# Patient Record
Sex: MALE | Race: WHITE | NOT HISPANIC OR LATINO | Employment: UNEMPLOYED | URBAN - METROPOLITAN AREA
[De-identification: names, ages, dates, MRNs, and addresses within clinical notes are randomized per-mention and may not be internally consistent; named-entity substitution may affect disease eponyms.]

---

## 2022-11-16 ENCOUNTER — APPOINTMENT (OUTPATIENT)
Dept: CT IMAGING | Facility: HOSPITAL | Age: 32
End: 2022-11-16

## 2022-11-16 ENCOUNTER — ANESTHESIA EVENT (OUTPATIENT)
Dept: PERIOP | Facility: HOSPITAL | Age: 32
End: 2022-11-16

## 2022-11-16 ENCOUNTER — APPOINTMENT (OUTPATIENT)
Dept: CARDIOLOGY | Facility: HOSPITAL | Age: 32
End: 2022-11-16

## 2022-11-16 ENCOUNTER — HOSPITAL ENCOUNTER (INPATIENT)
Facility: HOSPITAL | Age: 32
LOS: 4 days | Discharge: HOME | End: 2022-11-20
Attending: EMERGENCY MEDICINE | Admitting: INTERNAL MEDICINE

## 2022-11-16 DIAGNOSIS — L02.91 ABSCESS: ICD-10-CM

## 2022-11-16 DIAGNOSIS — F19.10 IV DRUG ABUSE: ICD-10-CM

## 2022-11-16 DIAGNOSIS — L02.413 ABSCESS OF ARM, RIGHT: Primary | ICD-10-CM

## 2022-11-16 DIAGNOSIS — M79.601 PAIN OF RIGHT UPPER EXTREMITY: ICD-10-CM

## 2022-11-16 LAB
ALBUMIN SERPL-MCNC: 4.4 G/DL (ref 3.5–5.2)
ALBUMIN/GLOB SERPL: 0.9 G/DL
ALP SERPL-CCNC: 142 U/L (ref 39–117)
ALT SERPL W P-5'-P-CCNC: 24 U/L (ref 1–41)
ANION GAP SERPL CALCULATED.3IONS-SCNC: 15 MMOL/L (ref 5–15)
AST SERPL-CCNC: 23 U/L (ref 1–40)
BASOPHILS # BLD AUTO: 0 10*3/MM3 (ref 0–0.2)
BASOPHILS NFR BLD AUTO: 0.2 % (ref 0–1.5)
BH CV UPPER VENOUS LEFT INTERNAL JUGULAR AUGMENT: NORMAL
BH CV UPPER VENOUS LEFT INTERNAL JUGULAR COMPRESS: NORMAL
BH CV UPPER VENOUS LEFT INTERNAL JUGULAR PHASIC: NORMAL
BH CV UPPER VENOUS LEFT INTERNAL JUGULAR SPONT: NORMAL
BH CV UPPER VENOUS LEFT SUBCLAVIAN AUGMENT: NORMAL
BH CV UPPER VENOUS LEFT SUBCLAVIAN COMPRESS: NORMAL
BH CV UPPER VENOUS LEFT SUBCLAVIAN PHASIC: NORMAL
BH CV UPPER VENOUS LEFT SUBCLAVIAN SPONT: NORMAL
BH CV UPPER VENOUS RIGHT AXILLARY AUGMENT: NORMAL
BH CV UPPER VENOUS RIGHT AXILLARY COMPRESS: NORMAL
BH CV UPPER VENOUS RIGHT AXILLARY PHASIC: NORMAL
BH CV UPPER VENOUS RIGHT AXILLARY SPONT: NORMAL
BH CV UPPER VENOUS RIGHT BASILIC UPPER COMPRESS: NORMAL
BH CV UPPER VENOUS RIGHT BRACHIAL COMPRESS: NORMAL
BH CV UPPER VENOUS RIGHT CEPHALIC UPPER COMPRESS: NORMAL
BH CV UPPER VENOUS RIGHT INTERNAL JUGULAR AUGMENT: NORMAL
BH CV UPPER VENOUS RIGHT INTERNAL JUGULAR COMPRESS: NORMAL
BH CV UPPER VENOUS RIGHT INTERNAL JUGULAR PHASIC: NORMAL
BH CV UPPER VENOUS RIGHT INTERNAL JUGULAR SPONT: NORMAL
BH CV UPPER VENOUS RIGHT SUBCLAVIAN AUGMENT: NORMAL
BH CV UPPER VENOUS RIGHT SUBCLAVIAN COMPRESS: NORMAL
BH CV UPPER VENOUS RIGHT SUBCLAVIAN PHASIC: NORMAL
BH CV UPPER VENOUS RIGHT SUBCLAVIAN SPONT: NORMAL
BILIRUB SERPL-MCNC: 0.7 MG/DL (ref 0–1.2)
BUN SERPL-MCNC: 11 MG/DL (ref 6–20)
BUN/CREAT SERPL: 13.9 (ref 7–25)
CALCIUM SPEC-SCNC: 10 MG/DL (ref 8.6–10.5)
CHLORIDE SERPL-SCNC: 94 MMOL/L (ref 98–107)
CO2 SERPL-SCNC: 28 MMOL/L (ref 22–29)
CREAT SERPL-MCNC: 0.79 MG/DL (ref 0.76–1.27)
D-LACTATE SERPL-SCNC: 0.9 MMOL/L (ref 0.5–2)
DEPRECATED RDW RBC AUTO: 36.8 FL (ref 37–54)
EGFRCR SERPLBLD CKD-EPI 2021: 121.8 ML/MIN/1.73
EOSINOPHIL # BLD AUTO: 0 10*3/MM3 (ref 0–0.4)
EOSINOPHIL NFR BLD AUTO: 0.1 % (ref 0.3–6.2)
ERYTHROCYTE [DISTWIDTH] IN BLOOD BY AUTOMATED COUNT: 12.6 % (ref 12.3–15.4)
GLOBULIN UR ELPH-MCNC: 4.9 GM/DL
GLUCOSE SERPL-MCNC: 97 MG/DL (ref 65–99)
HCT VFR BLD AUTO: 48.6 % (ref 37.5–51)
HGB BLD-MCNC: 16.2 G/DL (ref 13–17.7)
LYMPHOCYTES # BLD AUTO: 1.4 10*3/MM3 (ref 0.7–3.1)
LYMPHOCYTES NFR BLD AUTO: 4.9 % (ref 19.6–45.3)
MAXIMAL PREDICTED HEART RATE: 189 BPM
MCH RBC QN AUTO: 27.9 PG (ref 26.6–33)
MCHC RBC AUTO-ENTMCNC: 33.3 G/DL (ref 31.5–35.7)
MCV RBC AUTO: 83.9 FL (ref 79–97)
MONOCYTES # BLD AUTO: 1.6 10*3/MM3 (ref 0.1–0.9)
MONOCYTES NFR BLD AUTO: 6 % (ref 5–12)
NEUTROPHILS NFR BLD AUTO: 24.3 10*3/MM3 (ref 1.7–7)
NEUTROPHILS NFR BLD AUTO: 88.8 % (ref 42.7–76)
NRBC BLD AUTO-RTO: 0.1 /100 WBC (ref 0–0.2)
PLATELET # BLD AUTO: 456 10*3/MM3 (ref 140–450)
PMV BLD AUTO: 9 FL (ref 6–12)
POTASSIUM SERPL-SCNC: 3.9 MMOL/L (ref 3.5–5.2)
PROT SERPL-MCNC: 9.3 G/DL (ref 6–8.5)
QT INTERVAL: 363 MS
RBC # BLD AUTO: 5.79 10*6/MM3 (ref 4.14–5.8)
SODIUM SERPL-SCNC: 137 MMOL/L (ref 136–145)
STRESS TARGET HR: 161 BPM
WBC NRBC COR # BLD: 27.4 10*3/MM3 (ref 3.4–10.8)

## 2022-11-16 PROCEDURE — 93971 EXTREMITY STUDY: CPT

## 2022-11-16 PROCEDURE — 93005 ELECTROCARDIOGRAM TRACING: CPT | Performed by: INTERNAL MEDICINE

## 2022-11-16 PROCEDURE — 25010000002 VANCOMYCIN HCL 1.25 G RECONSTITUTED SOLUTION 1 EACH VIAL: Performed by: NURSE PRACTITIONER

## 2022-11-16 PROCEDURE — 25010000002 VANCOMYCIN HCL 1.25 G RECONSTITUTED SOLUTION 1 EACH VIAL

## 2022-11-16 PROCEDURE — 85025 COMPLETE CBC W/AUTO DIFF WBC: CPT

## 2022-11-16 PROCEDURE — 99284 EMERGENCY DEPT VISIT MOD MDM: CPT

## 2022-11-16 PROCEDURE — 25010000002 HYDROMORPHONE 1 MG/ML SOLUTION

## 2022-11-16 PROCEDURE — 83605 ASSAY OF LACTIC ACID: CPT

## 2022-11-16 PROCEDURE — 87040 BLOOD CULTURE FOR BACTERIA: CPT

## 2022-11-16 PROCEDURE — 25010000002 HYDROMORPHONE 1 MG/ML SOLUTION: Performed by: INTERNAL MEDICINE

## 2022-11-16 PROCEDURE — 25010000002 ONDANSETRON PER 1 MG

## 2022-11-16 PROCEDURE — 3E03329 INTRODUCTION OF OTHER ANTI-INFECTIVE INTO PERIPHERAL VEIN, PERCUTANEOUS APPROACH: ICD-10-PCS | Performed by: ORTHOPAEDIC SURGERY

## 2022-11-16 PROCEDURE — 0 IOPAMIDOL PER 1 ML: Performed by: EMERGENCY MEDICINE

## 2022-11-16 PROCEDURE — 80053 COMPREHEN METABOLIC PANEL: CPT

## 2022-11-16 PROCEDURE — 73206 CT ANGIO UPR EXTRM W/O&W/DYE: CPT

## 2022-11-16 PROCEDURE — 25010000002 MORPHINE PER 10 MG

## 2022-11-16 PROCEDURE — 25010000002 PIPERACILLIN SOD-TAZOBACTAM PER 1 G

## 2022-11-16 RX ORDER — SODIUM CHLORIDE 0.9 % (FLUSH) 0.9 %
10 SYRINGE (ML) INJECTION AS NEEDED
Status: DISCONTINUED | OUTPATIENT
Start: 2022-11-16 | End: 2022-11-20 | Stop reason: HOSPADM

## 2022-11-16 RX ORDER — CLONIDINE HYDROCHLORIDE 0.1 MG/1
0.1 TABLET ORAL 4 TIMES DAILY PRN
Status: DISCONTINUED | OUTPATIENT
Start: 2022-11-17 | End: 2022-11-17

## 2022-11-16 RX ORDER — ONDANSETRON 2 MG/ML
4 INJECTION INTRAMUSCULAR; INTRAVENOUS EVERY 6 HOURS PRN
Status: DISCONTINUED | OUTPATIENT
Start: 2022-11-16 | End: 2022-11-16

## 2022-11-16 RX ORDER — ACETAMINOPHEN 325 MG/1
650 TABLET ORAL EVERY 4 HOURS PRN
Status: DISCONTINUED | OUTPATIENT
Start: 2022-11-16 | End: 2022-11-20 | Stop reason: HOSPADM

## 2022-11-16 RX ORDER — CLONIDINE HYDROCHLORIDE 0.1 MG/1
0.1 TABLET ORAL 3 TIMES DAILY PRN
Status: DISCONTINUED | OUTPATIENT
Start: 2022-11-18 | End: 2022-11-17

## 2022-11-16 RX ORDER — NITROGLYCERIN 0.4 MG/1
0.4 TABLET SUBLINGUAL
Status: DISCONTINUED | OUTPATIENT
Start: 2022-11-16 | End: 2022-11-20 | Stop reason: HOSPADM

## 2022-11-16 RX ORDER — ONDANSETRON 2 MG/ML
4 INJECTION INTRAMUSCULAR; INTRAVENOUS EVERY 6 HOURS PRN
Status: DISCONTINUED | OUTPATIENT
Start: 2022-11-16 | End: 2022-11-20 | Stop reason: HOSPADM

## 2022-11-16 RX ORDER — CLONIDINE HYDROCHLORIDE 0.1 MG/1
0.1 TABLET ORAL ONCE AS NEEDED
Status: DISCONTINUED | OUTPATIENT
Start: 2022-11-20 | End: 2022-11-17

## 2022-11-16 RX ORDER — CLONIDINE HYDROCHLORIDE 0.1 MG/1
0.1 TABLET ORAL 4 TIMES DAILY PRN
Status: DISCONTINUED | OUTPATIENT
Start: 2022-11-16 | End: 2022-11-17

## 2022-11-16 RX ORDER — SODIUM CHLORIDE 0.9 % (FLUSH) 0.9 %
10 SYRINGE (ML) INJECTION EVERY 12 HOURS SCHEDULED
Status: DISCONTINUED | OUTPATIENT
Start: 2022-11-16 | End: 2022-11-20 | Stop reason: HOSPADM

## 2022-11-16 RX ORDER — ONDANSETRON 2 MG/ML
4 INJECTION INTRAMUSCULAR; INTRAVENOUS ONCE
Status: COMPLETED | OUTPATIENT
Start: 2022-11-16 | End: 2022-11-16

## 2022-11-16 RX ORDER — SODIUM CHLORIDE 9 MG/ML
75 INJECTION, SOLUTION INTRAVENOUS CONTINUOUS
Status: CANCELLED | OUTPATIENT
Start: 2022-11-16

## 2022-11-16 RX ORDER — ONDANSETRON 4 MG/1
4 TABLET, FILM COATED ORAL EVERY 6 HOURS PRN
Status: DISCONTINUED | OUTPATIENT
Start: 2022-11-16 | End: 2022-11-20 | Stop reason: HOSPADM

## 2022-11-16 RX ORDER — CLONIDINE HYDROCHLORIDE 0.1 MG/1
0.1 TABLET ORAL 2 TIMES DAILY PRN
Status: DISCONTINUED | OUTPATIENT
Start: 2022-11-19 | End: 2022-11-17

## 2022-11-16 RX ADMIN — SODIUM CHLORIDE 1000 ML: 9 INJECTION, SOLUTION INTRAVENOUS at 10:23

## 2022-11-16 RX ADMIN — IOPAMIDOL 100 ML: 755 INJECTION, SOLUTION INTRAVENOUS at 11:10

## 2022-11-16 RX ADMIN — ONDANSETRON 4 MG: 2 INJECTION INTRAMUSCULAR; INTRAVENOUS at 10:25

## 2022-11-16 RX ADMIN — HYDROMORPHONE HYDROCHLORIDE 1 MG: 1 INJECTION, SOLUTION INTRAMUSCULAR; INTRAVENOUS; SUBCUTANEOUS at 13:53

## 2022-11-16 RX ADMIN — VANCOMYCIN HYDROCHLORIDE 1250 MG: 1.25 INJECTION, POWDER, LYOPHILIZED, FOR SOLUTION INTRAVENOUS at 23:11

## 2022-11-16 RX ADMIN — ACETAMINOPHEN 650 MG: 325 TABLET, FILM COATED ORAL at 16:09

## 2022-11-16 RX ADMIN — MORPHINE SULFATE 4 MG: 4 INJECTION, SOLUTION INTRAMUSCULAR; INTRAVENOUS at 10:26

## 2022-11-16 RX ADMIN — VANCOMYCIN HYDROCHLORIDE 1250 MG: 1.25 INJECTION, POWDER, LYOPHILIZED, FOR SOLUTION INTRAVENOUS at 11:11

## 2022-11-16 RX ADMIN — HYDROMORPHONE HYDROCHLORIDE 1 MG: 1 INJECTION, SOLUTION INTRAMUSCULAR; INTRAVENOUS; SUBCUTANEOUS at 22:24

## 2022-11-16 RX ADMIN — HYDROMORPHONE HYDROCHLORIDE 1 MG: 1 INJECTION, SOLUTION INTRAMUSCULAR; INTRAVENOUS; SUBCUTANEOUS at 12:38

## 2022-11-16 RX ADMIN — PIPERACILLIN AND TAZOBACTAM 3.38 G: 3; .375 INJECTION, POWDER, FOR SOLUTION INTRAVENOUS at 10:28

## 2022-11-16 RX ADMIN — HYDROMORPHONE HYDROCHLORIDE 1 MG: 1 INJECTION, SOLUTION INTRAMUSCULAR; INTRAVENOUS; SUBCUTANEOUS at 18:11

## 2022-11-16 RX ADMIN — ACETAMINOPHEN 650 MG: 325 TABLET, FILM COATED ORAL at 20:26

## 2022-11-16 RX ADMIN — HYDROMORPHONE HYDROCHLORIDE 1 MG: 1 INJECTION, SOLUTION INTRAMUSCULAR; INTRAVENOUS; SUBCUTANEOUS at 20:23

## 2022-11-16 RX ADMIN — HYDROMORPHONE HYDROCHLORIDE 1 MG: 1 INJECTION, SOLUTION INTRAMUSCULAR; INTRAVENOUS; SUBCUTANEOUS at 16:09

## 2022-11-16 NOTE — ED PROVIDER NOTES
"Subjective   History of Present Illness  Chief Complaint: Arm pain    Context: Patient is a pleasant 32-year-old  male presenting today with complaints of right arm pain that has been ongoing for about 3 days.  Patient is a recovering IV drug user but states that he relapsed about 3 days ago, using IV heroin in his right arm.  Patient states that he is \"sure\" that he missed when injecting.  He states that he cannot move his arm due to the immense amount of pain.  He states that his arm is swollen and \"feels like his arm is going to pop.\"  He currently rates his pain 7/10 and describes it as a pressure.  It does not radiate and nothing seems to make it worse or better.  He has not taken his temperature but thinks he may have had a fever.  He has not noted chest pain, shortness of breath, dizziness or dysuria.  He denies any drug use since this relapse.  He states that he smokes about a pack of cigarettes daily but does not drink.  He has no known drug allergies and no pertinent medical history.  He does not take any medication daily basis.    Duration: 3 days    Timing: Waxes and wanes     Severity: Moderate to severe    Associated: Erythema        Review of Systems   Constitutional: Positive for fever. Negative for appetite change and chills.   HENT: Negative for congestion and rhinorrhea.    Respiratory: Negative for cough and shortness of breath.    Cardiovascular: Negative for chest pain and palpitations.   Gastrointestinal: Negative for abdominal pain, diarrhea, nausea and vomiting.   Genitourinary: Negative for dysuria and urgency.   Musculoskeletal: Positive for joint swelling and myalgias. Negative for arthralgias.   Skin: Positive for color change.   Neurological: Negative for dizziness, weakness and headaches.   Psychiatric/Behavioral: Negative for confusion. The patient is not nervous/anxious.    All other systems reviewed and are negative.      No past medical history on file.    No Known " Allergies    No past surgical history on file.    No family history on file.    Social History     Socioeconomic History   • Marital status: Single           Objective   Physical Exam  Vitals and nursing note reviewed.   Constitutional:       General: He is awake. He is not in acute distress.     Appearance: Normal appearance. He is well-developed and normal weight. He is not ill-appearing.   HENT:      Head: Normocephalic and atraumatic. No raccoon eyes or Segura's sign.      Right Ear: Hearing, tympanic membrane and ear canal normal.      Left Ear: Hearing, tympanic membrane and ear canal normal.   Eyes:      General: Vision grossly intact. Gaze aligned appropriately.      Extraocular Movements: Extraocular movements intact.      Pupils: Pupils are equal, round, and reactive to light.   Cardiovascular:      Rate and Rhythm: Normal rate and regular rhythm.      Pulses: Normal pulses.      Heart sounds: Normal heart sounds. No murmur heard.  Pulmonary:      Effort: Pulmonary effort is normal. No respiratory distress.      Breath sounds: Normal breath sounds.   Abdominal:      General: Bowel sounds are normal.      Palpations: Abdomen is soft.      Tenderness: There is no abdominal tenderness.   Musculoskeletal:         General: Swelling and tenderness present.      Right upper arm: Normal.      Left upper arm: Normal.      Right elbow: Swelling present.      Left elbow: Normal.      Right forearm: Swelling, edema and tenderness present.      Left forearm: Normal.        Arms:       Cervical back: Normal range of motion and neck supple.      Comments: Large blanchable erythematous area distal to left AC space on ventral aspect of forearm.   Skin:     General: Skin is warm and dry.      Capillary Refill: Capillary refill takes less than 2 seconds.   Neurological:      General: No focal deficit present.      Mental Status: He is alert and oriented to person, place, and time. Mental status is at baseline.      GCS: GCS  "eye subscore is 4. GCS verbal subscore is 5. GCS motor subscore is 6.      Cranial Nerves: Cranial nerves 2-12 are intact.      Sensory: Sensation is intact.      Motor: Motor function is intact.      Deep Tendon Reflexes: Reflexes are normal and symmetric.   Psychiatric:         Mood and Affect: Mood normal.         Behavior: Behavior normal.         Procedures           ED Course  ED Course as of 11/16/22 1406   Wed Nov 16, 2022   1020 POC lactic negative. [SJ]      ED Course User Index  [SJ] Ashley Milian APRN      /84 (BP Location: Left arm, Patient Position: Sitting)   Pulse 100   Temp 99.1 °F (37.3 °C) (Oral)   Resp 18   Ht 177.8 cm (70\")   Wt 65.9 kg (145 lb 4.5 oz)   SpO2 100%   BMI 20.85 kg/m²   Labs Reviewed   CBC WITH AUTO DIFFERENTIAL - Abnormal; Notable for the following components:       Result Value    WBC 27.40 (*)     RDW-SD 36.8 (*)     Platelets 456 (*)     Neutrophil % 88.8 (*)     Lymphocyte % 4.9 (*)     Eosinophil % 0.1 (*)     Neutrophils, Absolute 24.30 (*)     Monocytes, Absolute 1.60 (*)     All other components within normal limits   COMPREHENSIVE METABOLIC PANEL - Abnormal; Notable for the following components:    Chloride 94 (*)     Total Protein 9.3 (*)     Alkaline Phosphatase 142 (*)     All other components within normal limits    Narrative:     GFR Normal >60  Chronic Kidney Disease <60  Kidney Failure <15     POC LACTATE - Normal   BLOOD CULTURE   BLOOD CULTURE   POC LACTATE   CBC AND DIFFERENTIAL    Narrative:     The following orders were created for panel order CBC & Differential.  Procedure                               Abnormality         Status                     ---------                               -----------         ------                     CBC Auto Differential[[id number]]         Abnormal            Final result                 Please view results for these tests on the individual orders.     Medications   sodium chloride 0.9 % flush 10 mL (has no " administration in time range)   cloNIDine (CATAPRES) tablet 0.1 mg (has no administration in time range)     Followed by   cloNIDine (CATAPRES) tablet 0.1 mg (has no administration in time range)     Followed by   cloNIDine (CATAPRES) tablet 0.1 mg (has no administration in time range)     Followed by   cloNIDine (CATAPRES) tablet 0.1 mg (has no administration in time range)     Followed by   cloNIDine (CATAPRES) tablet 0.1 mg (has no administration in time range)   ondansetron (ZOFRAN) injection 4 mg (has no administration in time range)   HYDROmorphone (DILAUDID) injection 1 mg (has no administration in time range)   sodium chloride 0.9 % bolus 1,000 mL (0 mL Intravenous Stopped 11/16/22 1228)   ondansetron (ZOFRAN) injection 4 mg (4 mg Intravenous Given 11/16/22 1025)   morphine injection 4 mg (4 mg Intravenous Given 11/16/22 1026)   piperacillin-tazobactam (ZOSYN) IVPB 3.375 g in 100 mL NS (CD) (0 g Intravenous Stopped 11/16/22 1111)   Vancomycin HCl 1,250 mg in sodium chloride 0.9 % 250 mL IVPB (1,250 mg Intravenous Given 11/16/22 1111)   iopamidol (ISOVUE-370) 76 % injection 100 mL (100 mL Intravenous Given 11/16/22 1110)   HYDROmorphone (DILAUDID) injection 1 mg (1 mg Intravenous Given 11/16/22 1238)   HYDROmorphone (DILAUDID) injection 1 mg (1 mg Intravenous Given 11/16/22 1353)     CT Angiogram Upper Extremity Right With & Without Contrast    Result Date: 11/16/2022   1. Abscess within the volar aspect of the musculature in the proximal forearm measuring 10.4 x 3.5 x 4.3 cm. 2. No definite arterial involvement.  Electronically Signed By-Jeremy England MD On:11/16/2022 12:35 PM This report was finalized on [id number] by  Jeremy England MD.                                         MDM  Number of Diagnoses or Management Options  Abscess of arm, right  IV drug abuse (HCC)  Pain of right upper extremity  Diagnosis management comments: Patient was placed in a gown prior to assessment.  He is alert,  nontoxic-appearing and stable on exam.  IV was established and labs were obtained.  Contrasted CT study was ordered to assess for abscess and arm.  Sepsis orders placed, including antibiotics and blood cultures.  Patient was medicated with Zofran and morphine.  Morphine did not help patient's pain and he was given a milligram of Dilaudid.    Labs reveal a white count of 27.4 today.  Platelets are 456.  Hemoglobin is normal.  Lactic acid was normal as well.  Blood cultures were collected.  CT reveals abscess within the volar aspect of the musculature and proximal forearm measuring 10.4 x 3.5 x 4.3 cm.  There is no definite arterial involvement.  I consulted with on-call general surgeon, who recommended consult with Ortho as well.  On-call orthopedist will be down to see the patient.  Upon reassessment, patient is sitting in bed with family at bedside.  He is tearful and states that he is still in excruciating pain.  Another milligram of Dilaudid was ordered.  Results were discussed with the patient, who verbalized understanding and is agreeable to plan of care.  He does not have a primary care provider at this time and will be admitted to the hospital.  I consulted BRITTNEY Badillo with the hospitalist group.  She accepted patient on behalf of Dr. Salmeron, who will be providing inpatient care.     Comorbidities: Prior IV drug use  Differentials: Cellulitis, abscess, septic arthritis, not all inclusive of differentials considered.   Discussion with Provider: Dr. Izaguirre    Lab Interpretation: As above  Radiology Interpretation: As above    Appropriate PPE worn during exam.    This document is intended for medical expert use only.  Reading of this document by patients and/or patient's family without participating medical staff guidance may result in misinterpretation and unintended morbidity.  Any interpretation of such data is the responsibility of the patient and/or family member responsible for the patient in concert with  their primary or specialist providers, not to be left for sources of online search as such as [employer], [employer] or similar queries.  Relying on these approaches to knowledge may result in misinterpretation, misguided goals of care and even death should patient or family members try recommendations outside of the realm of professional medical care in a supervised inpatient environment.    This medical document was created using Dragon dictation system. Some errors in speech recognition may occur.       Amount and/or Complexity of Data Reviewed  Clinical lab tests: ordered and reviewed  Tests in the radiology section of CPT®: ordered and reviewed  Discuss the patient with other providers: yes (Dr. Blankenship - general surgery  Dr. Galindo - orthopedics)    Risk of Complications, Morbidity, and/or Mortality  Presenting problems: high  Diagnostic procedures: high  Management options: high    Patient Progress  Patient progress: stable      Final diagnoses:   Abscess of arm, right   Pain of right upper extremity   IV drug abuse (HCC)       ED Disposition  ED Disposition     ED Disposition   Decision to Admit    Condition   --    Comment   Level of Care: Telemetry [5]   Diagnosis: Abscess of arm, right [266533]   Admitting Physician: GARRY CROWDER [[id number]]   Attending Physician: GARRY CROWDER [[id number]]   Certification: I Certify That Inpatient Hospital Services Are Medically Necessary For Greater Than 2 Midnights               No follow-up provider specified.       Medication List      No changes were made to your prescriptions during this visit.          Ashley Milian, APRN  11/16/22 1404

## 2022-11-16 NOTE — PROGRESS NOTES
"Pharmacy Antimicrobial Dosing Service    Subjective:  Zach Blandon is a 32 y.o.male admitted with abscess of the arm. Pharmacy has been consulted to dose Vancomycin for possible sepsis/SSTI.    PMH: recovering IV drug user, but relapsed 3 days ago       Assessment/Plan    1. Day #1 Vancomycin: Goal -600 mcg*h/mL. Vanc 1250mg (19mg/kg ABW) given in ED. Will schedule vancomycin 1250mg (19 mg/kg ABW) q12h. Will obtain level prior to fifth total dose on 11/18. Predicted  mcg/mL.hr.    2. Day #1 Piperacillin/Tazobactam: 3.375g IV x1 dose in ED.    Will continue to monitor drug levels, renal function, culture and sensitivities, and patient clinical status.       Objective:  Relevant clinical data and objective history reviewed:  177.8 cm (70\")   65.9 kg (145 lb 4.5 oz)   Ideal body weight: 73 kg (160 lb 15 oz)  Body mass index is 20.85 kg/m².        Results from last 7 days   Lab Units 11/16/22  1016   CREATININE mg/dL 0.79     Estimated Creatinine Clearance: 126.3 mL/min (by C-G formula based on SCr of 0.79 mg/dL).  No intake/output data recorded.    Results from last 7 days   Lab Units 11/16/22  1016   WBC 10*3/mm3 27.40*     Temperature    11/16/22 0822   Temp: 99.1 °F (37.3 °C)     Baseline culture/source/susceptibility:  Microbiology Results (last 10 days)       ** No results found for the last 240 hours. **            Anti-Infectives (From admission, onward)      Ordered     Dose/Rate Route Frequency Start Stop    11/16/22 1840  Vancomycin HCl 1,250 mg in sodium chloride 0.9 % 250 mL IVPB        Ordering Provider: Aby Hernandez APRN    1,250 mg Intravenous Every 12 Hours 11/16/22 2300 11/23/22 2259    11/16/22 1523  Pharmacy to dose vancomycin        Ordering Provider: Aby Hernandez APRN     Does not apply Continuous PRN 11/16/22 1523 11/23/22 1522    11/16/22 0925  Vancomycin HCl 1,250 mg in sodium chloride 0.9 % 250 mL IVPB        Ordering Provider: Ashley Milian APRN    20 mg/kg × " 65.9 kg Intravenous Once 11/16/22 1030 11/16/22 1111    11/16/22 0925  piperacillin-tazobactam (ZOSYN) IVPB 3.375 g in 100 mL NS (CD)        Ordering Provider: Ashley Milian APRN    3.375 g  over 30 Minutes Intravenous Once 11/16/22 0927 11/16/22 1111            Denise Flores RPH  11/16/22 18:41 EST

## 2022-11-16 NOTE — CASE MANAGEMENT/SOCIAL WORK
Discharge Planning Assessment  [facility]     Patient Name: Zach Blandon  MRN: [id number]  Today's Date: 11/16/2022    Admit Date: 11/16/2022    Plan: From home with Mother, Need PCP, Watch for IV ATB   Discharge Needs Assessment     Row Name 11/16/22 9180       Living Environment    People in Home parent(s)    Current Living Arrangements home    Primary Care Provided by self    Provides Primary Care For no one    Able to Return to Prior Arrangements yes       Resource/Environmental Concerns    Resource/Environmental Concerns none    Transportation Concerns none       Transition Planning    Patient/Family Anticipates Transition to home with family    Patient/Family Anticipated Services at Transition none    Transportation Anticipated family or friend will provide       Discharge Needs Assessment    Concerns to be Addressed substance/tobacco abuse/use  Need SW screen    Anticipated Changes Related to Illness none    Equipment Needed After Discharge none    Current Discharge Risk substance use/abuse               Discharge Plan     Row Name 11/16/22 1751       Plan    Plan From home with Mother, Need PCP, Watch for IV ATB    Patient/Family in Agreement with Plan yes    Plan Comments Met with Patient at bedside Lives at home with mother in [place] now. No PCP but [facility]. Pharmacy verified, able to afford medications. Mother will provide tranportation. D/C Barriers; Surgery consult, IV ATB                  Expected Discharge Date and Time     Expected Discharge Date Expected Discharge Time    Nov 18, 2022          Demographic Summary     Row Name 11/16/22 6460       General Information    Admission Type inpatient    Arrived From emergency department    Referral Source admission list    Reason for Consult discharge planning    Preferred Language English               Functional Status     Row Name 11/16/22 4429       Functional Status    Usual Activity Tolerance good    Current Activity  Tolerance good       Functional Status, IADL    Medications independent    Meal Preparation independent    Housekeeping independent    Laundry independent    Shopping independent       Mental Status    General Appearance WDL WDL       Mental Status Summary    Recent Changes in Mental Status/Cognitive Functioning no changes              Met with patient at bedside wearing mask and goggles, Spent less than 15 minutes in room at greater than 6 feet distance.           Katheryn Berg RN

## 2022-11-16 NOTE — H&P
"    [facility] Medicine Services      Patient Name: Zach Blandon  : 1990  MRN: [id number]  Primary Care Physician:  Provider, No Known  Date of admission: 2022      Subjective      Chief Complaint: Right arm pain secondary to abscess    History of Present Illness: Zach Blandon is a 32 y.o. male who presented to [facility] on 2022 complaining of right arm pain that has been ongoing for about 3 days.  Patient is a recovering IV drug user but states that he relapsed about 3 days ago, using IV heroin in his right arm.  Patient states that he is \"sure\" that he missed when injecting.  He states that he cannot move his arm due to the immense amount of pain.  He states that his arm is swollen and \"feels like his arm is going to pop.\"  He currently rates his pain 7/10 and describes it as a pressure.  It does not radiate and nothing seems to make it worse or better.  He has not taken his temperature but thinks he may have had a fever.  He has not noted chest pain, shortness of breath, dizziness or dysuria.  He denies any drug use since this relapse.  He states that he smokes about a pack of cigarettes daily but does not drink.  He has no known drug allergies and no pertinent medical history.  He does not take any medication daily basis.    In ED, WBC 27.40, blood cultures pending, CT of the right upper extremity shows an abscess.  Patient was given Zosyn and vancomycin in ER.  He also received morphine for pain which did not help and is now getting Dilaudid as needed.      Review of Systems   Skin: Positive for color change.        Right forearm to the elbow is swollen red and warm to touch   All other systems reviewed and are negative.       Personal History     No past medical history on file.    No past surgical history on file.    Family History: family history is not on file. Otherwise pertinent FHx was reviewed and not pertinent to current issue.    Social History:  "     Home Medications:  Prior to Admission Medications     None            Allergies:  No Known Allergies    Objective      Vitals:   Temp:  [99.1 °F (37.3 °C)] 99.1 °F (37.3 °C)  Heart Rate:  [100] 100  Resp:  [18] 18  BP: (120)/(84) 120/84    Physical Exam  Vitals reviewed.   Eyes:      Extraocular Movements: Extraocular movements intact.      Pupils: Pupils are equal, round, and reactive to light.   Cardiovascular:      Rate and Rhythm: Tachycardia present.      Pulses: Normal pulses.      Heart sounds: Normal heart sounds.   Pulmonary:      Effort: Pulmonary effort is normal.      Breath sounds: Normal breath sounds.   Abdominal:      General: Bowel sounds are normal.      Palpations: Abdomen is soft.   Musculoskeletal:         General: Swelling present.      Right forearm: Swelling, edema and tenderness present.        Arms:       Comments: Warm to touch   Skin:     General: Skin is warm and dry.   Neurological:      Mental Status: He is alert and oriented to person, place, and time.          Result Review    Result Review:  I have personally reviewed the results from the time of this admission to 11/16/2022 13:54 EST and agree with these findings:  [x]  Laboratory  []  Microbiology  [x]  Radiology  [x]  EKG/Telemetry   [x]  Cardiology/Vascular   []  Pathology  []  Old records  [x]  Other:      Assessment & Plan        Active Hospital Problems:  There are no active hospital problems to display for this patient.    Plan:     Right forearm abscess  - WBC 27.40  - CT reviewed  - Dilaudid as needed for pain  - Dr. Galindo with Ortho consulted  - Pharmacy to dose vancomycin    Pain of right upper extremity  - Dilaudid as needed for pain  - Awaiting Dr. Rios's recommendations/plan  - Right venous Doppler ultrasound ordered to rule out DVT    History of IV drug use  - Last time he used was 3 days ago  - Clonidine opioid detoxification protocol ordered    DVT prophylaxis:  -Mechanical SCDs    CODE STATUS:        Admission Status:  I believe this patient meets inpatient status.    I discussed the patient's findings and my recommendations with patient.      Signature: Electronically signed by XIOMARA Dooley, 11/16/22, 1:54 PM EST.

## 2022-11-17 ENCOUNTER — ANESTHESIA (OUTPATIENT)
Dept: PERIOP | Facility: HOSPITAL | Age: 32
End: 2022-11-17

## 2022-11-17 LAB
ANION GAP SERPL CALCULATED.3IONS-SCNC: 15 MMOL/L (ref 5–15)
BASOPHILS # BLD AUTO: 0.1 10*3/MM3 (ref 0–0.2)
BASOPHILS NFR BLD AUTO: 0.4 % (ref 0–1.5)
BUN SERPL-MCNC: 6 MG/DL (ref 6–20)
BUN/CREAT SERPL: 10 (ref 7–25)
CALCIUM SPEC-SCNC: 8.7 MG/DL (ref 8.6–10.5)
CHLORIDE SERPL-SCNC: 97 MMOL/L (ref 98–107)
CO2 SERPL-SCNC: 25 MMOL/L (ref 22–29)
CREAT SERPL-MCNC: 0.6 MG/DL (ref 0.76–1.27)
DEPRECATED RDW RBC AUTO: 37.2 FL (ref 37–54)
EGFRCR SERPLBLD CKD-EPI 2021: 132.4 ML/MIN/1.73
EOSINOPHIL # BLD AUTO: 0 10*3/MM3 (ref 0–0.4)
EOSINOPHIL NFR BLD AUTO: 0.2 % (ref 0.3–6.2)
ERYTHROCYTE [DISTWIDTH] IN BLOOD BY AUTOMATED COUNT: 12.6 % (ref 12.3–15.4)
GLUCOSE SERPL-MCNC: 93 MG/DL (ref 65–99)
HCT VFR BLD AUTO: 41.5 % (ref 37.5–51)
HGB BLD-MCNC: 13.6 G/DL (ref 13–17.7)
LYMPHOCYTES # BLD AUTO: 2.1 10*3/MM3 (ref 0.7–3.1)
LYMPHOCYTES NFR BLD AUTO: 9.2 % (ref 19.6–45.3)
MCH RBC QN AUTO: 27.6 PG (ref 26.6–33)
MCHC RBC AUTO-ENTMCNC: 32.8 G/DL (ref 31.5–35.7)
MCV RBC AUTO: 84.1 FL (ref 79–97)
MONOCYTES # BLD AUTO: 2 10*3/MM3 (ref 0.1–0.9)
MONOCYTES NFR BLD AUTO: 9.1 % (ref 5–12)
NEUTROPHILS NFR BLD AUTO: 18.1 10*3/MM3 (ref 1.7–7)
NEUTROPHILS NFR BLD AUTO: 81.1 % (ref 42.7–76)
NRBC BLD AUTO-RTO: 0 /100 WBC (ref 0–0.2)
PLATELET # BLD AUTO: 403 10*3/MM3 (ref 140–450)
PMV BLD AUTO: 9.2 FL (ref 6–12)
POTASSIUM SERPL-SCNC: 3.1 MMOL/L (ref 3.5–5.2)
RBC # BLD AUTO: 4.93 10*6/MM3 (ref 4.14–5.8)
SODIUM SERPL-SCNC: 137 MMOL/L (ref 136–145)
WBC NRBC COR # BLD: 22.3 10*3/MM3 (ref 3.4–10.8)

## 2022-11-17 PROCEDURE — 25010000002 FENTANYL CITRATE (PF) 50 MCG/ML SOLUTION: Performed by: NURSE ANESTHETIST, CERTIFIED REGISTERED

## 2022-11-17 PROCEDURE — 85025 COMPLETE CBC W/AUTO DIFF WBC: CPT | Performed by: NURSE PRACTITIONER

## 2022-11-17 PROCEDURE — 25010000002 HYDROMORPHONE 1 MG/ML SOLUTION: Performed by: INTERNAL MEDICINE

## 2022-11-17 PROCEDURE — 80048 BASIC METABOLIC PNL TOTAL CA: CPT | Performed by: NURSE PRACTITIONER

## 2022-11-17 PROCEDURE — 25010000002 HYDROMORPHONE 1 MG/ML SOLUTION: Performed by: NURSE ANESTHETIST, CERTIFIED REGISTERED

## 2022-11-17 PROCEDURE — 25010000002 VANCOMYCIN HCL 1.25 G RECONSTITUTED SOLUTION 1 EACH VIAL: Performed by: ORTHOPAEDIC SURGERY

## 2022-11-17 PROCEDURE — 25010000002 ONDANSETRON PER 1 MG: Performed by: NURSE ANESTHETIST, CERTIFIED REGISTERED

## 2022-11-17 PROCEDURE — 87186 SC STD MICRODIL/AGAR DIL: CPT | Performed by: ORTHOPAEDIC SURGERY

## 2022-11-17 PROCEDURE — 87205 SMEAR GRAM STAIN: CPT | Performed by: ORTHOPAEDIC SURGERY

## 2022-11-17 PROCEDURE — 25010000002 MIDAZOLAM PER 1 MG: Performed by: NURSE ANESTHETIST, CERTIFIED REGISTERED

## 2022-11-17 PROCEDURE — 25010000002 MORPHINE PER 10 MG: Performed by: ORTHOPAEDIC SURGERY

## 2022-11-17 PROCEDURE — 25010000002 PROPOFOL 10 MG/ML EMULSION: Performed by: NURSE ANESTHETIST, CERTIFIED REGISTERED

## 2022-11-17 PROCEDURE — 36415 COLL VENOUS BLD VENIPUNCTURE: CPT | Performed by: NURSE PRACTITIONER

## 2022-11-17 PROCEDURE — 25010000002 HYDROMORPHONE 1 MG/ML SOLUTION: Performed by: ORTHOPAEDIC SURGERY

## 2022-11-17 PROCEDURE — 87070 CULTURE OTHR SPECIMN AEROBIC: CPT | Performed by: ORTHOPAEDIC SURGERY

## 2022-11-17 PROCEDURE — 25010000002 CEFTRIAXONE PER 250 MG: Performed by: ORTHOPAEDIC SURGERY

## 2022-11-17 PROCEDURE — 0X9D0ZZ DRAINAGE OF RIGHT LOWER ARM, OPEN APPROACH: ICD-10-PCS | Performed by: ORTHOPAEDIC SURGERY

## 2022-11-17 PROCEDURE — 25010000002 KETOROLAC TROMETHAMINE PER 15 MG: Performed by: ANESTHESIOLOGY

## 2022-11-17 RX ORDER — PROPOFOL 10 MG/ML
VIAL (ML) INTRAVENOUS AS NEEDED
Status: DISCONTINUED | OUTPATIENT
Start: 2022-11-17 | End: 2022-11-17 | Stop reason: SURG

## 2022-11-17 RX ORDER — ONDANSETRON 2 MG/ML
INJECTION INTRAMUSCULAR; INTRAVENOUS AS NEEDED
Status: DISCONTINUED | OUTPATIENT
Start: 2022-11-17 | End: 2022-11-17 | Stop reason: SURG

## 2022-11-17 RX ORDER — ACETAMINOPHEN 500 MG
1000 TABLET ORAL ONCE
Status: COMPLETED | OUTPATIENT
Start: 2022-11-17 | End: 2022-11-17

## 2022-11-17 RX ORDER — LABETALOL HYDROCHLORIDE 5 MG/ML
10 INJECTION, SOLUTION INTRAVENOUS
Status: DISCONTINUED | OUTPATIENT
Start: 2022-11-17 | End: 2022-11-17 | Stop reason: HOSPADM

## 2022-11-17 RX ORDER — DROPERIDOL 2.5 MG/ML
0.62 INJECTION, SOLUTION INTRAMUSCULAR; INTRAVENOUS ONCE AS NEEDED
Status: DISCONTINUED | OUTPATIENT
Start: 2022-11-17 | End: 2022-11-17 | Stop reason: HOSPADM

## 2022-11-17 RX ORDER — POLYETHYLENE GLYCOL 3350 17 G/17G
17 POWDER, FOR SOLUTION ORAL DAILY
Status: DISCONTINUED | OUTPATIENT
Start: 2022-11-17 | End: 2022-11-20 | Stop reason: HOSPADM

## 2022-11-17 RX ORDER — PROMETHAZINE HYDROCHLORIDE 25 MG/1
25 SUPPOSITORY RECTAL ONCE AS NEEDED
Status: DISCONTINUED | OUTPATIENT
Start: 2022-11-17 | End: 2022-11-17 | Stop reason: HOSPADM

## 2022-11-17 RX ORDER — SODIUM CHLORIDE 0.9 % (FLUSH) 0.9 %
1-10 SYRINGE (ML) INJECTION AS NEEDED
Status: DISCONTINUED | OUTPATIENT
Start: 2022-11-17 | End: 2022-11-20 | Stop reason: HOSPADM

## 2022-11-17 RX ORDER — SODIUM CHLORIDE, SODIUM LACTATE, POTASSIUM CHLORIDE, CALCIUM CHLORIDE 600; 310; 30; 20 MG/100ML; MG/100ML; MG/100ML; MG/100ML
1000 INJECTION, SOLUTION INTRAVENOUS CONTINUOUS
Status: DISCONTINUED | OUTPATIENT
Start: 2022-11-17 | End: 2022-11-18

## 2022-11-17 RX ORDER — KETAMINE HCL IN NACL, ISO-OSM 100MG/10ML
SYRINGE (ML) INJECTION AS NEEDED
Status: DISCONTINUED | OUTPATIENT
Start: 2022-11-17 | End: 2022-11-17 | Stop reason: SURG

## 2022-11-17 RX ORDER — FENTANYL CITRATE 50 UG/ML
INJECTION, SOLUTION INTRAMUSCULAR; INTRAVENOUS AS NEEDED
Status: DISCONTINUED | OUTPATIENT
Start: 2022-11-17 | End: 2022-11-17 | Stop reason: SURG

## 2022-11-17 RX ORDER — SODIUM CHLORIDE 0.9 % (FLUSH) 0.9 %
10 SYRINGE (ML) INJECTION AS NEEDED
Status: DISCONTINUED | OUTPATIENT
Start: 2022-11-17 | End: 2022-11-17 | Stop reason: HOSPADM

## 2022-11-17 RX ORDER — ACETAMINOPHEN 325 MG/1
325 TABLET ORAL EVERY 4 HOURS PRN
Status: DISCONTINUED | OUTPATIENT
Start: 2022-11-17 | End: 2022-11-17 | Stop reason: SDUPTHER

## 2022-11-17 RX ORDER — ONDANSETRON 2 MG/ML
4 INJECTION INTRAMUSCULAR; INTRAVENOUS EVERY 6 HOURS PRN
Status: DISCONTINUED | OUTPATIENT
Start: 2022-11-17 | End: 2022-11-17 | Stop reason: SDUPTHER

## 2022-11-17 RX ORDER — KETOROLAC TROMETHAMINE 30 MG/ML
30 INJECTION, SOLUTION INTRAMUSCULAR; INTRAVENOUS ONCE AS NEEDED
Status: COMPLETED | OUTPATIENT
Start: 2022-11-17 | End: 2022-11-17

## 2022-11-17 RX ORDER — MIDAZOLAM HYDROCHLORIDE 1 MG/ML
INJECTION INTRAMUSCULAR; INTRAVENOUS AS NEEDED
Status: DISCONTINUED | OUTPATIENT
Start: 2022-11-17 | End: 2022-11-17 | Stop reason: SURG

## 2022-11-17 RX ORDER — LIDOCAINE HYDROCHLORIDE 10 MG/ML
0.5 INJECTION, SOLUTION INFILTRATION; PERINEURAL ONCE AS NEEDED
Status: DISCONTINUED | OUTPATIENT
Start: 2022-11-17 | End: 2022-11-17 | Stop reason: HOSPADM

## 2022-11-17 RX ORDER — HYDROCODONE BITARTRATE AND ACETAMINOPHEN 7.5; 325 MG/1; MG/1
1 TABLET ORAL EVERY 4 HOURS PRN
Status: DISCONTINUED | OUTPATIENT
Start: 2022-11-17 | End: 2022-11-19

## 2022-11-17 RX ORDER — OXYCODONE HYDROCHLORIDE 5 MG/1
10 TABLET ORAL ONCE AS NEEDED
Status: COMPLETED | OUTPATIENT
Start: 2022-11-17 | End: 2022-11-17

## 2022-11-17 RX ORDER — MEPERIDINE HYDROCHLORIDE 25 MG/ML
12.5 INJECTION INTRAMUSCULAR; INTRAVENOUS; SUBCUTANEOUS
Status: DISCONTINUED | OUTPATIENT
Start: 2022-11-17 | End: 2022-11-17 | Stop reason: HOSPADM

## 2022-11-17 RX ORDER — IBUPROFEN 400 MG/1
600 TABLET ORAL ONCE AS NEEDED
Status: DISCONTINUED | OUTPATIENT
Start: 2022-11-17 | End: 2022-11-17 | Stop reason: HOSPADM

## 2022-11-17 RX ORDER — DIPHENHYDRAMINE HYDROCHLORIDE 50 MG/ML
12.5 INJECTION INTRAMUSCULAR; INTRAVENOUS ONCE AS NEEDED
Status: DISCONTINUED | OUTPATIENT
Start: 2022-11-17 | End: 2022-11-17 | Stop reason: HOSPADM

## 2022-11-17 RX ORDER — FENTANYL CITRATE 50 UG/ML
50 INJECTION, SOLUTION INTRAMUSCULAR; INTRAVENOUS
Status: DISCONTINUED | OUTPATIENT
Start: 2022-11-17 | End: 2022-11-17

## 2022-11-17 RX ORDER — ONDANSETRON 4 MG/1
4 TABLET, FILM COATED ORAL EVERY 6 HOURS PRN
Status: DISCONTINUED | OUTPATIENT
Start: 2022-11-17 | End: 2022-11-17 | Stop reason: SDUPTHER

## 2022-11-17 RX ORDER — FENTANYL CITRATE 50 UG/ML
100 INJECTION, SOLUTION INTRAMUSCULAR; INTRAVENOUS
Status: DISCONTINUED | OUTPATIENT
Start: 2022-11-17 | End: 2022-11-17 | Stop reason: HOSPADM

## 2022-11-17 RX ORDER — NALOXONE HCL 0.4 MG/ML
0.4 VIAL (ML) INJECTION
Status: DISCONTINUED | OUTPATIENT
Start: 2022-11-17 | End: 2022-11-20 | Stop reason: HOSPADM

## 2022-11-17 RX ORDER — SODIUM CHLORIDE 9 MG/ML
125 INJECTION, SOLUTION INTRAVENOUS CONTINUOUS
Status: DISCONTINUED | OUTPATIENT
Start: 2022-11-17 | End: 2022-11-18

## 2022-11-17 RX ORDER — PROMETHAZINE HYDROCHLORIDE 25 MG/1
25 TABLET ORAL ONCE AS NEEDED
Status: DISCONTINUED | OUTPATIENT
Start: 2022-11-17 | End: 2022-11-17 | Stop reason: HOSPADM

## 2022-11-17 RX ORDER — OXYCODONE HYDROCHLORIDE 5 MG/1
10 TABLET ORAL EVERY 4 HOURS PRN
Status: DISCONTINUED | OUTPATIENT
Start: 2022-11-17 | End: 2022-11-20 | Stop reason: HOSPADM

## 2022-11-17 RX ADMIN — SODIUM CHLORIDE 125 ML/HR: 9 INJECTION, SOLUTION INTRAVENOUS at 10:01

## 2022-11-17 RX ADMIN — Medication 25 MG: at 07:29

## 2022-11-17 RX ADMIN — VANCOMYCIN HYDROCHLORIDE 1250 MG: 1.25 INJECTION, POWDER, LYOPHILIZED, FOR SOLUTION INTRAVENOUS at 22:55

## 2022-11-17 RX ADMIN — OXYCODONE 10 MG: 5 TABLET ORAL at 19:24

## 2022-11-17 RX ADMIN — ONDANSETRON 4 MG: 2 INJECTION INTRAMUSCULAR; INTRAVENOUS at 07:38

## 2022-11-17 RX ADMIN — Medication 10 ML: at 10:02

## 2022-11-17 RX ADMIN — OXYCODONE 10 MG: 5 TABLET ORAL at 12:34

## 2022-11-17 RX ADMIN — Medication 10 ML: at 20:00

## 2022-11-17 RX ADMIN — HYDROMORPHONE HYDROCHLORIDE 1 MG: 1 INJECTION, SOLUTION INTRAMUSCULAR; INTRAVENOUS; SUBCUTANEOUS at 03:34

## 2022-11-17 RX ADMIN — HYDROMORPHONE HYDROCHLORIDE 1 MG: 1 INJECTION, SOLUTION INTRAMUSCULAR; INTRAVENOUS; SUBCUTANEOUS at 07:33

## 2022-11-17 RX ADMIN — OXYCODONE 10 MG: 5 TABLET ORAL at 08:30

## 2022-11-17 RX ADMIN — HYDROMORPHONE HYDROCHLORIDE 1 MG: 1 INJECTION, SOLUTION INTRAMUSCULAR; INTRAVENOUS; SUBCUTANEOUS at 17:22

## 2022-11-17 RX ADMIN — KETOROLAC TROMETHAMINE 30 MG: 30 INJECTION, SOLUTION INTRAMUSCULAR; INTRAVENOUS at 08:31

## 2022-11-17 RX ADMIN — CEFTRIAXONE 2 G: 2 INJECTION, POWDER, FOR SOLUTION INTRAMUSCULAR; INTRAVENOUS at 10:01

## 2022-11-17 RX ADMIN — Medication 25 MG: at 07:38

## 2022-11-17 RX ADMIN — MORPHINE SULFATE 4 MG: 4 INJECTION, SOLUTION INTRAMUSCULAR; INTRAVENOUS at 21:56

## 2022-11-17 RX ADMIN — POLYETHYLENE GLYCOL 3350 17 G: 17 POWDER, FOR SOLUTION ORAL at 12:33

## 2022-11-17 RX ADMIN — FENTANYL CITRATE 100 MCG: 50 INJECTION, SOLUTION INTRAMUSCULAR; INTRAVENOUS at 08:36

## 2022-11-17 RX ADMIN — LIDOCAINE HYDROCHLORIDE 100 MG: 20 INJECTION, SOLUTION INTRAVENOUS at 07:24

## 2022-11-17 RX ADMIN — HYDROMORPHONE HYDROCHLORIDE 1 MG: 1 INJECTION, SOLUTION INTRAMUSCULAR; INTRAVENOUS; SUBCUTANEOUS at 19:24

## 2022-11-17 RX ADMIN — HYDROCODONE BITARTRATE AND ACETAMINOPHEN 1 TABLET: 7.5; 325 TABLET ORAL at 22:55

## 2022-11-17 RX ADMIN — HYDROMORPHONE HYDROCHLORIDE 1 MG: 1 INJECTION, SOLUTION INTRAMUSCULAR; INTRAVENOUS; SUBCUTANEOUS at 22:55

## 2022-11-17 RX ADMIN — MIDAZOLAM 2 MG: 1 INJECTION INTRAMUSCULAR; INTRAVENOUS at 07:27

## 2022-11-17 RX ADMIN — HYDROMORPHONE HYDROCHLORIDE 1 MG: 1 INJECTION, SOLUTION INTRAMUSCULAR; INTRAVENOUS; SUBCUTANEOUS at 00:28

## 2022-11-17 RX ADMIN — PROPOFOL 200 MG: 10 INJECTION, EMULSION INTRAVENOUS at 07:24

## 2022-11-17 RX ADMIN — ACETAMINOPHEN 1000 MG: 500 TABLET ORAL at 08:30

## 2022-11-17 RX ADMIN — HYDROMORPHONE HYDROCHLORIDE 1 MG: 1 INJECTION, SOLUTION INTRAMUSCULAR; INTRAVENOUS; SUBCUTANEOUS at 12:29

## 2022-11-17 RX ADMIN — FENTANYL CITRATE 100 MCG: 50 INJECTION, SOLUTION INTRAMUSCULAR; INTRAVENOUS at 08:05

## 2022-11-17 RX ADMIN — HYDROCODONE BITARTRATE AND ACETAMINOPHEN 1 TABLET: 7.5; 325 TABLET ORAL at 16:02

## 2022-11-17 RX ADMIN — FENTANYL CITRATE 100 MCG: 50 INJECTION, SOLUTION INTRAMUSCULAR; INTRAVENOUS at 07:24

## 2022-11-17 RX ADMIN — VANCOMYCIN HYDROCHLORIDE 1250 MG: 1.25 INJECTION, POWDER, LYOPHILIZED, FOR SOLUTION INTRAVENOUS at 11:00

## 2022-11-17 RX ADMIN — SODIUM CHLORIDE, SODIUM LACTATE, POTASSIUM CHLORIDE, AND CALCIUM CHLORIDE: .6; .31; .03; .02 INJECTION, SOLUTION INTRAVENOUS at 07:16

## 2022-11-17 NOTE — ANESTHESIA PREPROCEDURE EVALUATION
Anesthesia Evaluation     Patient summary reviewed and Nursing notes reviewed   NPO Solid Status: > 8 hours  NPO Liquid Status: > 8 hours           Airway   Mallampati: I  TM distance: >3 FB  Neck ROM: full  No difficulty expected  Dental - normal exam     Pulmonary - normal exam   (+) a smoker Current Abstained day of surgery,   Cardiovascular - negative cardio ROS and normal exam        Neuro/Psych- negative ROS  GI/Hepatic/Renal/Endo - negative ROS     Musculoskeletal (-) negative ROS    Abdominal  - normal exam    Bowel sounds: normal.   Substance History   (+) drug use     OB/GYN negative ob/gyn ROS         Other        ROS/Med Hx Other: Heroin use - last 5 days ago.                  Anesthesia Plan    ASA 3     general     intravenous induction       Plan discussed with CRNA and CAA.        CODE STATUS:    Level Of Support Discussed With: Patient  Code Status (Patient has no pulse and is not breathing): CPR (Attempt to Resuscitate)  Medical Interventions (Patient has pulse or is breathing): Full Support

## 2022-11-17 NOTE — ANESTHESIA PROCEDURE NOTES
Airway  Urgency: elective    Date/Time: 11/17/2022 7:25 AM  Airway not difficult    General Information and Staff    Patient location during procedure: OR  CRNA/CAA: Peyton Fernandez CRNA    Indications and Patient Condition  Indications for airway management: airway protection    Preoxygenated: yes  MILS maintained throughout  Mask difficulty assessment: 1 - vent by mask    Final Airway Details  Final airway type: supraglottic airway      Successful airway: classic and LMA  Size 4     Number of attempts at approach: 1  Assessment: lips, teeth, and gum same as pre-op and atraumatic intubation

## 2022-11-17 NOTE — PAYOR COMM NOTE
"PA FORM WITH CLINICALS FOR INPATIENT PRECERT:                                  AUTHORIZATION PENDING:   PLEASE CALL OR FAX DETERMINATION TO CONTACT BELOW. THANK YOU.        Brenda Schuler RN MSN  /UR  [facility]  [phone] office  [phone] fax  [email or web address]    [facility]  NPI: [id number]  Tax: [phone]                Jean Claude Blandon (32 y.o. Male)     Date of Birth   1990    Social Security Number       Address   [address] [place] [address] [place] [address]    Home Phone   [phone]    MRN   [id number]       Sikh   None    Marital Status   Single                            Admission Date   11/16/22    Admission Type   Emergency    Admitting Provider   Xu Salmeron MD    Attending Provider   Soren Garcia MD    Department, Room/Bed   [facility] 2A PEDIATRICS, 206/1       Discharge Date       Discharge Disposition       Discharge Destination                               Attending Provider: Soren Garcia MD    Allergies: No Known Allergies    Isolation: None   Infection: None   Code Status: CPR    Ht: 177.8 cm (70\")   Wt: 65.9 kg (145 lb 4.5 oz)    Admission Cmt: None   Principal Problem: Abscess of arm, right [L02.413]                 Active Insurance as of 11/16/2022     Primary Coverage     Payor Plan Insurance Group Employer/Plan Group    [facility] BY CELY [facility] BY CELY [id number]     Payor Plan Address Payor Plan Phone Number Payor Plan Fax Number Effective Dates    [address] [address]   1/1/2021 - None Entered    [facility] [address]       Subscriber Name Subscriber Birth Date Member ID       JEAN CLAUDE BLANDON 1990 [id number]                 Emergency Contacts      (Rel.) Home Phone Work Phone Mobile Phone    NOAHROSIO CLIFTON (Mother) [phone] -- [phone]        11/16/22 1404  Inpatient Admission  Once     Completed     Level of Care: Telemetry    Diagnosis: " "Abscess of arm, right [265950]    Admitting Physician: GARRY CROWDER [[id number]]    Attending Physician: GARRY CROWDER [[id number]]    Certification: I Certify That Inpatient Hospital Services Are Medically Necessary For Greater Than 2 Midnights                   History & Physical      Aby Hernandez APRN at 22 [phone]     Attestation signed by Garry Crowder MD at 22 [phone]    I have reviewed this documentation and agree.                      [facility]      Patient Name: Zach Blandon  : 1990  MRN: [id number]  Primary Care Physician:  Provider, No Known  Date of admission: 2022      Subjective       Chief Complaint: Right arm pain secondary to abscess    History of Present Illness: Zach Blandon is a 32 y.o. male who presented to [facility] on 2022 complaining of right arm pain that has been ongoing for about 3 days.  Patient is a recovering IV drug user but states that he relapsed about 3 days ago, using IV heroin in his right arm.  Patient states that he is \"sure\" that he missed when injecting.  He states that he cannot move his arm due to the immense amount of pain.  He states that his arm is swollen and \"feels like his arm is going to pop.\"  He currently rates his pain 7/10 and describes it as a pressure.  It does not radiate and nothing seems to make it worse or better.  He has not taken his temperature but thinks he may have had a fever.  He has not noted chest pain, shortness of breath, dizziness or dysuria.  He denies any drug use since this relapse.  He states that he smokes about a pack of cigarettes daily but does not drink.  He has no known drug allergies and no pertinent medical history.  He does not take any medication daily basis.    In ED, WBC 27.40, blood cultures pending, CT of the right upper extremity shows an abscess.  Patient was given Zosyn and vancomycin in ER. "  He also received morphine for pain which did not help and is now getting Dilaudid as needed.      Review of Systems   Skin: Positive for color change.        Right forearm to the elbow is swollen red and warm to touch   All other systems reviewed and are negative.       Personal History     No past medical history on file.    No past surgical history on file.    Family History: family history is not on file. Otherwise pertinent FHx was reviewed and not pertinent to current issue.    Social History:      Home Medications:  Prior to Admission Medications     None            Allergies:  No Known Allergies    Objective       Vitals:   Temp:  [99.1 °F (37.3 °C)] 99.1 °F (37.3 °C)  Heart Rate:  [100] 100  Resp:  [18] 18  BP: (120)/(84) 120/84    Physical Exam  Vitals reviewed.   Eyes:      Extraocular Movements: Extraocular movements intact.      Pupils: Pupils are equal, round, and reactive to light.   Cardiovascular:      Rate and Rhythm: Tachycardia present.      Pulses: Normal pulses.      Heart sounds: Normal heart sounds.   Pulmonary:      Effort: Pulmonary effort is normal.      Breath sounds: Normal breath sounds.   Abdominal:      General: Bowel sounds are normal.      Palpations: Abdomen is soft.   Musculoskeletal:         General: Swelling present.      Right forearm: Swelling, edema and tenderness present.        Arms:       Comments: Warm to touch   Skin:     General: Skin is warm and dry.   Neurological:      Mental Status: He is alert and oriented to person, place, and time.          Result Review    Result Review:  I have personally reviewed the results from the time of this admission to 11/16/2022 13:54 EST and agree with these findings:  [x]  Laboratory  []  Microbiology  [x]  Radiology  [x]  EKG/Telemetry   [x]  Cardiology/Vascular   []  Pathology  []  Old records  [x]  Other:      Assessment & Plan        Active Hospital Problems:  There are no active hospital problems to display for this  "patient.    Plan:     Right forearm abscess  - WBC 27.40  - CT reviewed  - Dilaudid as needed for pain  - Dr. Galindo with Ortho consulted  - Pharmacy to dose vancomycin    Pain of right upper extremity  - Dilaudid as needed for pain  - Awaiting Dr. Rios's recommendations/plan  - Right venous Doppler ultrasound ordered to rule out DVT    History of IV drug use  - Last time he used was 3 days ago  - Clonidine opioid detoxification protocol ordered    DVT prophylaxis:  -Mechanical SCDs    CODE STATUS:       Admission Status:  I believe this patient meets inpatient status.    I discussed the patient's findings and my recommendations with patient.      Signature: Electronically signed by XIOMARA Dooley, 11/16/22, 1:54 PM EST.      Electronically signed by Xu Salmeron MD at 11/16/22 1717          Emergency Department Notes      Ashley Milian APRN at 11/16/22 1001          Subjective   History of Present Illness  Chief Complaint: Arm pain    Context: Patient is a pleasant 32-year-old  male presenting today with complaints of right arm pain that has been ongoing for about 3 days.  Patient is a recovering IV drug user but states that he relapsed about 3 days ago, using IV heroin in his right arm.  Patient states that he is \"sure\" that he missed when injecting.  He states that he cannot move his arm due to the immense amount of pain.  He states that his arm is swollen and \"feels like his arm is going to pop.\"  He currently rates his pain 7/10 and describes it as a pressure.  It does not radiate and nothing seems to make it worse or better.  He has not taken his temperature but thinks he may have had a fever.  He has not noted chest pain, shortness of breath, dizziness or dysuria.  He denies any drug use since this relapse.  He states that he smokes about a pack of cigarettes daily but does not drink.  He has no known drug allergies and no pertinent medical history.  He does not " take any medication daily basis.    Duration: 3 days    Timing: Waxes and wanes     Severity: Moderate to severe    Associated: Erythema        Review of Systems   Constitutional: Positive for fever. Negative for appetite change and chills.   HENT: Negative for congestion and rhinorrhea.    Respiratory: Negative for cough and shortness of breath.    Cardiovascular: Negative for chest pain and palpitations.   Gastrointestinal: Negative for abdominal pain, diarrhea, nausea and vomiting.   Genitourinary: Negative for dysuria and urgency.   Musculoskeletal: Positive for joint swelling and myalgias. Negative for arthralgias.   Skin: Positive for color change.   Neurological: Negative for dizziness, weakness and headaches.   Psychiatric/Behavioral: Negative for confusion. The patient is not nervous/anxious.    All other systems reviewed and are negative.      No past medical history on file.    No Known Allergies    No past surgical history on file.    No family history on file.    Social History     Socioeconomic History   • Marital status: Single           Objective   Physical Exam  Vitals and nursing note reviewed.   Constitutional:       General: He is awake. He is not in acute distress.     Appearance: Normal appearance. He is well-developed and normal weight. He is not ill-appearing.   HENT:      Head: Normocephalic and atraumatic. No raccoon eyes or Segura's sign.      Right Ear: Hearing, tympanic membrane and ear canal normal.      Left Ear: Hearing, tympanic membrane and ear canal normal.   Eyes:      General: Vision grossly intact. Gaze aligned appropriately.      Extraocular Movements: Extraocular movements intact.      Pupils: Pupils are equal, round, and reactive to light.   Cardiovascular:      Rate and Rhythm: Normal rate and regular rhythm.      Pulses: Normal pulses.      Heart sounds: Normal heart sounds. No murmur heard.  Pulmonary:      Effort: Pulmonary effort is normal. No respiratory distress.       "Breath sounds: Normal breath sounds.   Abdominal:      General: Bowel sounds are normal.      Palpations: Abdomen is soft.      Tenderness: There is no abdominal tenderness.   Musculoskeletal:         General: Swelling and tenderness present.      Right upper arm: Normal.      Left upper arm: Normal.      Right elbow: Swelling present.      Left elbow: Normal.      Right forearm: Swelling, edema and tenderness present.      Left forearm: Normal.        Arms:       Cervical back: Normal range of motion and neck supple.      Comments: Large blanchable erythematous area distal to left AC space on ventral aspect of forearm.   Skin:     General: Skin is warm and dry.      Capillary Refill: Capillary refill takes less than 2 seconds.   Neurological:      General: No focal deficit present.      Mental Status: He is alert and oriented to person, place, and time. Mental status is at baseline.      GCS: GCS eye subscore is 4. GCS verbal subscore is 5. GCS motor subscore is 6.      Cranial Nerves: Cranial nerves 2-12 are intact.      Sensory: Sensation is intact.      Motor: Motor function is intact.      Deep Tendon Reflexes: Reflexes are normal and symmetric.   Psychiatric:         Mood and Affect: Mood normal.         Behavior: Behavior normal.         Procedures          ED Course  ED Course as of 11/16/22 1406   Wed Nov 16, 2022   1020 POC lactic negative. [SJ]      ED Course User Index  [SJ] Ashley Milian, XIOMARA      /84 (BP Location: Left arm, Patient Position: Sitting)   Pulse 100   Temp 99.1 °F (37.3 °C) (Oral)   Resp 18   Ht 177.8 cm (70\")   Wt 65.9 kg (145 lb 4.5 oz)   SpO2 100%   BMI 20.85 kg/m²   Labs Reviewed   CBC WITH AUTO DIFFERENTIAL - Abnormal; Notable for the following components:       Result Value    WBC 27.40 (*)     RDW-SD 36.8 (*)     Platelets 456 (*)     Neutrophil % 88.8 (*)     Lymphocyte % 4.9 (*)     Eosinophil % 0.1 (*)     Neutrophils, Absolute 24.30 (*)     Monocytes, Absolute " 1.60 (*)     All other components within normal limits   COMPREHENSIVE METABOLIC PANEL - Abnormal; Notable for the following components:    Chloride 94 (*)     Total Protein 9.3 (*)     Alkaline Phosphatase 142 (*)     All other components within normal limits    Narrative:     GFR Normal >60  Chronic Kidney Disease <60  Kidney Failure <15     POC LACTATE - Normal   BLOOD CULTURE   BLOOD CULTURE   POC LACTATE   CBC AND DIFFERENTIAL    Narrative:     The following orders were created for panel order CBC & Differential.  Procedure                               Abnormality         Status                     ---------                               -----------         ------                     CBC Auto Differential[[id number]]         Abnormal            Final result                 Please view results for these tests on the individual orders.     Medications   sodium chloride 0.9 % flush 10 mL (has no administration in time range)   cloNIDine (CATAPRES) tablet 0.1 mg (has no administration in time range)     Followed by   cloNIDine (CATAPRES) tablet 0.1 mg (has no administration in time range)     Followed by   cloNIDine (CATAPRES) tablet 0.1 mg (has no administration in time range)     Followed by   cloNIDine (CATAPRES) tablet 0.1 mg (has no administration in time range)     Followed by   cloNIDine (CATAPRES) tablet 0.1 mg (has no administration in time range)   ondansetron (ZOFRAN) injection 4 mg (has no administration in time range)   HYDROmorphone (DILAUDID) injection 1 mg (has no administration in time range)   sodium chloride 0.9 % bolus 1,000 mL (0 mL Intravenous Stopped 11/16/22 1228)   ondansetron (ZOFRAN) injection 4 mg (4 mg Intravenous Given 11/16/22 1025)   morphine injection 4 mg (4 mg Intravenous Given 11/16/22 1026)   piperacillin-tazobactam (ZOSYN) IVPB 3.375 g in 100 mL NS (CD) (0 g Intravenous Stopped 11/16/22 1111)   Vancomycin HCl 1,250 mg in sodium chloride 0.9 % 250 mL IVPB (1,250 mg Intravenous  Given 11/16/22 1111)   iopamidol (ISOVUE-370) 76 % injection 100 mL (100 mL Intravenous Given 11/16/22 1110)   HYDROmorphone (DILAUDID) injection 1 mg (1 mg Intravenous Given 11/16/22 1238)   HYDROmorphone (DILAUDID) injection 1 mg (1 mg Intravenous Given 11/16/22 1353)     CT Angiogram Upper Extremity Right With & Without Contrast    Result Date: 11/16/2022   1. Abscess within the volar aspect of the musculature in the proximal forearm measuring 10.4 x 3.5 x 4.3 cm. 2. No definite arterial involvement.  Electronically Signed By-Jeremy England MD On:11/16/2022 12:35 PM This report was finalized on [id number] by  Jeremy England MD.                                         MDM  Number of Diagnoses or Management Options  Abscess of arm, right  IV drug abuse (HCC)  Pain of right upper extremity  Diagnosis management comments: Patient was placed in a gown prior to assessment.  He is alert, nontoxic-appearing and stable on exam.  IV was established and labs were obtained.  Contrasted CT study was ordered to assess for abscess and arm.  Sepsis orders placed, including antibiotics and blood cultures.  Patient was medicated with Zofran and morphine.  Morphine did not help patient's pain and he was given a milligram of Dilaudid.    Labs reveal a white count of 27.4 today.  Platelets are 456.  Hemoglobin is normal.  Lactic acid was normal as well.  Blood cultures were collected.  CT reveals abscess within the volar aspect of the musculature and proximal forearm measuring 10.4 x 3.5 x 4.3 cm.  There is no definite arterial involvement.  I consulted with on-call general surgeon, who recommended consult with Ortho as well.  On-call orthopedist will be down to see the patient.  Upon reassessment, patient is sitting in bed with family at bedside.  He is tearful and states that he is still in excruciating pain.  Another milligram of Dilaudid was ordered.  Results were discussed with the patient, who verbalized understanding and is  agreeable to plan of care.  He does not have a primary care provider at this time and will be admitted to the hospital.  I consulted BRITTNEY Badillo with the hospitalist group.  She accepted patient on behalf of Dr. Salmeron, who will be providing inpatient care.     Comorbidities: Prior IV drug use  Differentials: Cellulitis, abscess, septic arthritis, not all inclusive of differentials considered.   Discussion with Provider: Dr. Izaguirre    Lab Interpretation: As above  Radiology Interpretation: As above    Appropriate PPE worn during exam.    This document is intended for medical expert use only.  Reading of this document by patients and/or patient's family without participating medical staff guidance may result in misinterpretation and unintended morbidity.  Any interpretation of such data is the responsibility of the patient and/or family member responsible for the patient in concert with their primary or specialist providers, not to be left for sources of online search as such as [employer], [employer] or similar queries.  Relying on these approaches to knowledge may result in misinterpretation, misguided goals of care and even death should patient or family members try recommendations outside of the realm of professional medical care in a supervised inpatient environment.    This medical document was created using Dragon dictation system. Some errors in speech recognition may occur.       Amount and/or Complexity of Data Reviewed  Clinical lab tests: ordered and reviewed  Tests in the radiology section of CPT®: ordered and reviewed  Discuss the patient with other providers: yes (Dr. Blankenship - general surgery  Dr. Galindo - orthopedics)    Risk of Complications, Morbidity, and/or Mortality  Presenting problems: high  Diagnostic procedures: high  Management options: high    Patient Progress  Patient progress: stable      Final diagnoses:   Abscess of arm, right   Pain of right upper extremity   IV drug abuse (HCC)       ED  Disposition  ED Disposition     ED Disposition   Decision to Admit    Condition   --    Comment   Level of Care: Telemetry [5]   Diagnosis: Abscess of arm, right [[id number]]   Admitting Physician: GARRY CROWDER [[id number]]   Attending Physician: AGRRY CROWDER [[id number]]   Certification: I Certify That Inpatient Hospital Services Are Medically Necessary For Greater Than 2 Midnights               No follow-up provider specified.       Medication List      No changes were made to your prescriptions during this visit.          Ashley Milian APRN  11/16/22 1406      Electronically signed by Ashley Milian APRN at 11/16/22 1406       Physician Progress Notes (all)    No notes of this type exist for this encounter.            Consult Notes (all)      Jeremy Galindo MD at 11/16/22 1914      Consult Orders    1. Ortho (on-call MD unless specified) [[id number]] ordered by Ashley Milian APRN at 11/16/22 1316                   Referring Provider: Emergency room  Reason for Consultation: Right arm abscess    Patient Care Team:  Provider, No Known as PCP - General  Provider, No Known as PCP - Family Medicine      Subjective .     History of present illness:  Zach Blandon is a 32 y.o. male who presents with right forearm abscess medial elbow and volar forearm for about 4 days.  He is a IV drug user.  Has had prior abscesses.  Having some numbness in the first through third digits.  Fairly painful.  Not on antibiotics prior    Review of Systems:    Fevers or chills      History  No past medical history on file.  No past surgical history on file.  No family history on file.      (Not in a hospital admission)     Patient has no known allergies.    Scheduled Meds:sodium chloride, 10 mL, Intravenous, Q12H  vancomycin, 1,250 mg, Intravenous, Q12H      Continuous Infusions:Pharmacy to dose vancomycin,       PRN Meds:.•  acetaminophen  •  cloNIDine **FOLLOWED BY** [START ON 11/17/2022]  "cloNIDine **FOLLOWED BY** [START ON 11/18/2022] cloNIDine **FOLLOWED BY** [START ON 11/19/2022] cloNIDine **FOLLOWED BY** [START ON 11/20/2022] cloNIDine  •  HYDROmorphone  •  nitroglycerin  •  ondansetron **OR** ondansetron  •  Pharmacy to dose vancomycin  •  [COMPLETED] Insert Peripheral IV **AND** sodium chloride  •  sodium chloride    Objective     Vital Signs   Vitals:    11/16/22 0822 11/16/22 1458 11/16/22 1458   BP: 120/84  146/84   BP Location: Left arm  Left arm   Patient Position: Sitting  Lying   Pulse: 100 93    Resp: 18  18   Temp: 99.1 °F (37.3 °C)     TempSrc: Oral     SpO2: 100% 99% 99%   Weight: 65.9 kg (145 lb 4.5 oz)     Height: 177.8 cm (70\")           Physical Exam:   White male in apparent distress, alert and orient x3, normal height and weight  Right forearm shows erythema and swelling along the volar medial aspect.  Tender there.  No open wounds.  Decree sensation first through third digits.  5/5 radial median and ulnar nerve function.  No increased pain with finger flexion or extension.  2+ radial pulse    CT scan shows a 10 x 3 x 4 cm forearm abscess    Results Review:   I reviewed the patient's new clinical results.  I reviewed the patient's new imaging results    Lab Results (last 24 hours)     Procedure Component Value Units Date/Time    Comprehensive Metabolic Panel [[id number]]  (Abnormal) Collected: 11/16/22 1016    Specimen: Blood Updated: 11/16/22 1046     Glucose 97 mg/dL      BUN 11 mg/dL      Creatinine 0.79 mg/dL      Sodium 137 mmol/L      Potassium 3.9 mmol/L      Chloride 94 mmol/L      CO2 28.0 mmol/L      Calcium 10.0 mg/dL      Total Protein 9.3 g/dL      Albumin 4.40 g/dL      ALT (SGPT) 24 U/L      AST (SGOT) 23 U/L      Alkaline Phosphatase 142 U/L      Total Bilirubin 0.7 mg/dL      Globulin 4.9 gm/dL      A/G Ratio 0.9 g/dL      BUN/Creatinine Ratio 13.9     Anion Gap 15.0 mmol/L      eGFR 121.8 mL/min/1.73      Comment: National Kidney Foundation and American Society " of Nephrology (ASN) Task Force recommended calculation based on the Chronic Kidney Disease Epidemiology Collaboration (CKD-EPI) equation refit without adjustment for race.       Narrative:      GFR Normal >60  Chronic Kidney Disease <60  Kidney Failure <15      CBC & Differential [[id number]]  (Abnormal) Collected: 11/16/22 1016    Specimen: Blood Updated: 11/16/22 1026    Narrative:      The following orders were created for panel order CBC & Differential.  Procedure                               Abnormality         Status                     ---------                               -----------         ------                     CBC Auto Differential[[id number]]         Abnormal            Final result                 Please view results for these tests on the individual orders.    CBC Auto Differential [[id number]]  (Abnormal) Collected: 11/16/22 1016    Specimen: Blood Updated: 11/16/22 1026     WBC 27.40 10*3/mm3      RBC 5.79 10*6/mm3      Hemoglobin 16.2 g/dL      Hematocrit 48.6 %      MCV 83.9 fL      MCH 27.9 pg      MCHC 33.3 g/dL      RDW 12.6 %      RDW-SD 36.8 fl      MPV 9.0 fL      Platelets 456 10*3/mm3      Neutrophil % 88.8 %      Lymphocyte % 4.9 %      Monocyte % 6.0 %      Eosinophil % 0.1 %      Basophil % 0.2 %      Neutrophils, Absolute 24.30 10*3/mm3      Lymphocytes, Absolute 1.40 10*3/mm3      Monocytes, Absolute 1.60 10*3/mm3      Eosinophils, Absolute 0.00 10*3/mm3      Basophils, Absolute 0.00 10*3/mm3      nRBC 0.1 /100 WBC     POC Lactate [[id number]]  (Normal) Collected: 11/16/22 1019    Specimen: Blood Updated: 11/16/22 1021     Lactate 0.9 mmol/L      Comment: Serial Number: [id number]:  [id number]       Blood Culture - Blood, Arm, Left [[id number]] Collected: 11/16/22 1016    Specimen: Blood from Arm, Left Updated: 11/16/22 1019    Blood Culture - Blood, Arm, Left [[id number]] Collected: 11/16/22 0946    Specimen: Blood from Arm, Left Updated: 11/16/22 0949          Imaging Results (Last  24 Hours)     Procedure Component Value Units Date/Time    CT Angiogram Upper Extremity Right With & Without Contrast [[id number]] Collected: 11/16/22 1228     Updated: 11/16/22 1238    Narrative:         DATE OF EXAM:  11/16/2022 11:08 AM     PROCEDURE:  CT ANGIOGRAM UPPER EXTREMITY RIGHT W WO CONTRAST-     INDICATIONS:   swelling, erythema s/p IV drug use     COMPARISON:   No Comparisons Available     TECHNIQUE:  CT angiographic protocol was utilized during intravenous administration  of Isovue-370 contrast media. Arterial phase axial images are obtained  through the right upper extremity. Coronal and sagittal reconstructions  are also provided. 3-D volume rendered reconstructed images were created  and reviewed along with the source images. Automated exposure control  and iterative reconstruction methods were used.     FINDINGS:  VASCULAR FINDINGS: The subclavian, axial, brachial, radial, ulnar, and  interosseous arteries appear patent. No evidence of vascular narrowing  or occlusion.     NONVASCULAR FINDINGS: Visualized structures within the chest appear  normal. There is a air and fluid collection identified in the volar  aspect of the forearm measuring 10.4 x 3.5 x 4.3 cm. This fluid  collection is within the musculature. There is mild overlying soft  tissue swelling in the subcutaneous fat. There is no obvious bone  erosion or destruction. There is no definite involvement of the elbow  joint.          Impression:         1. Abscess within the volar aspect of the musculature in the proximal  forearm measuring 10.4 x 3.5 x 4.3 cm.  2. No definite arterial involvement.     Electronically Signed By-Jeremy England MD On:11/16/2022 12:35 PM  This report was finalized on [id number] by  Jeremy England MD.            Assessment & Plan     Right forearm abscess    Admit on IV antibiotics.  We will plan on incision and drainage tomorrow.  Patient attends risk include bleeding, infection, damage to nerves or blood vessels,  possible need for further surgery, and the risk of medical anesthetic complications include the risk of death.      Jeremy Galindo MD  11/16/22  19:16 EST          Electronically signed by Jeremy Galindo MD at 11/16/22 1918

## 2022-11-17 NOTE — CONSULTS
"    Referring Provider: Emergency room  Reason for Consultation: Right arm abscess    Patient Care Team:  Provider, No Known as PCP - General  Provider, No Known as PCP - Family Medicine      Subjective .     History of present illness:  Zach Blandon is a 32 y.o. male who presents with right forearm abscess medial elbow and volar forearm for about 4 days.  He is a IV drug user.  Has had prior abscesses.  Having some numbness in the first through third digits.  Fairly painful.  Not on antibiotics prior    Review of Systems:    Fevers or chills      History  No past medical history on file.  No past surgical history on file.  No family history on file.      (Not in a hospital admission)     Patient has no known allergies.    Scheduled Meds:sodium chloride, 10 mL, Intravenous, Q12H  vancomycin, 1,250 mg, Intravenous, Q12H      Continuous Infusions:Pharmacy to dose vancomycin,       PRN Meds:.•  acetaminophen  •  cloNIDine **FOLLOWED BY** [START ON 11/17/2022] cloNIDine **FOLLOWED BY** [START ON 11/18/2022] cloNIDine **FOLLOWED BY** [START ON 11/19/2022] cloNIDine **FOLLOWED BY** [START ON 11/20/2022] cloNIDine  •  HYDROmorphone  •  nitroglycerin  •  ondansetron **OR** ondansetron  •  Pharmacy to dose vancomycin  •  [COMPLETED] Insert Peripheral IV **AND** sodium chloride  •  sodium chloride    Objective     Vital Signs   Vitals:    11/16/22 0822 11/16/22 1458 11/16/22 1458   BP: 120/84  146/84   BP Location: Left arm  Left arm   Patient Position: Sitting  Lying   Pulse: 100 93    Resp: 18  18   Temp: 99.1 °F (37.3 °C)     TempSrc: Oral     SpO2: 100% 99% 99%   Weight: 65.9 kg (145 lb 4.5 oz)     Height: 177.8 cm (70\")           Physical Exam:   White male in apparent distress, alert and orient x3, normal height and weight  Right forearm shows erythema and swelling along the volar medial aspect.  Tender there.  No open wounds.  Decree sensation first through third digits.  5/5 radial median and ulnar nerve function.  " No increased pain with finger flexion or extension.  2+ radial pulse    CT scan shows a 10 x 3 x 4 cm forearm abscess    Results Review:   I reviewed the patient's new clinical results.  I reviewed the patient's new imaging results    Lab Results (last 24 hours)     Procedure Component Value Units Date/Time    Comprehensive Metabolic Panel [[id number]]  (Abnormal) Collected: 11/16/22 1016    Specimen: Blood Updated: 11/16/22 1046     Glucose 97 mg/dL      BUN 11 mg/dL      Creatinine 0.79 mg/dL      Sodium 137 mmol/L      Potassium 3.9 mmol/L      Chloride 94 mmol/L      CO2 28.0 mmol/L      Calcium 10.0 mg/dL      Total Protein 9.3 g/dL      Albumin 4.40 g/dL      ALT (SGPT) 24 U/L      AST (SGOT) 23 U/L      Alkaline Phosphatase 142 U/L      Total Bilirubin 0.7 mg/dL      Globulin 4.9 gm/dL      A/G Ratio 0.9 g/dL      BUN/Creatinine Ratio 13.9     Anion Gap 15.0 mmol/L      eGFR 121.8 mL/min/1.73      Comment: National Kidney Foundation and American Society of Nephrology (ASN) Task Force recommended calculation based on the Chronic Kidney Disease Epidemiology Collaboration (CKD-EPI) equation refit without adjustment for race.       Narrative:      GFR Normal >60  Chronic Kidney Disease <60  Kidney Failure <15      CBC & Differential [[id number]]  (Abnormal) Collected: 11/16/22 1016    Specimen: Blood Updated: 11/16/22 1026    Narrative:      The following orders were created for panel order CBC & Differential.  Procedure                               Abnormality         Status                     ---------                               -----------         ------                     CBC Auto Differential[[id number]]         Abnormal            Final result                 Please view results for these tests on the individual orders.    CBC Auto Differential [[id number]]  (Abnormal) Collected: 11/16/22 1016    Specimen: Blood Updated: 11/16/22 1026     WBC 27.40 10*3/mm3      RBC 5.79 10*6/mm3      Hemoglobin 16.2 g/dL       Hematocrit 48.6 %      MCV 83.9 fL      MCH 27.9 pg      MCHC 33.3 g/dL      RDW 12.6 %      RDW-SD 36.8 fl      MPV 9.0 fL      Platelets 456 10*3/mm3      Neutrophil % 88.8 %      Lymphocyte % 4.9 %      Monocyte % 6.0 %      Eosinophil % 0.1 %      Basophil % 0.2 %      Neutrophils, Absolute 24.30 10*3/mm3      Lymphocytes, Absolute 1.40 10*3/mm3      Monocytes, Absolute 1.60 10*3/mm3      Eosinophils, Absolute 0.00 10*3/mm3      Basophils, Absolute 0.00 10*3/mm3      nRBC 0.1 /100 WBC     POC Lactate [[id number]]  (Normal) Collected: 11/16/22 1019    Specimen: Blood Updated: 11/16/22 1021     Lactate 0.9 mmol/L      Comment: Serial Number: [id number]:  [id number]       Blood Culture - Blood, Arm, Left [[id number]] Collected: 11/16/22 1016    Specimen: Blood from Arm, Left Updated: 11/16/22 1019    Blood Culture - Blood, Arm, Left [[id number]] Collected: 11/16/22 0946    Specimen: Blood from Arm, Left Updated: 11/16/22 0949          Imaging Results (Last 24 Hours)     Procedure Component Value Units Date/Time    CT Angiogram Upper Extremity Right With & Without Contrast [[id number]] Collected: 11/16/22 1228     Updated: 11/16/22 1238    Narrative:         DATE OF EXAM:  11/16/2022 11:08 AM     PROCEDURE:  CT ANGIOGRAM UPPER EXTREMITY RIGHT W WO CONTRAST-     INDICATIONS:   swelling, erythema s/p IV drug use     COMPARISON:   No Comparisons Available     TECHNIQUE:  CT angiographic protocol was utilized during intravenous administration  of Isovue-370 contrast media. Arterial phase axial images are obtained  through the right upper extremity. Coronal and sagittal reconstructions  are also provided. 3-D volume rendered reconstructed images were created  and reviewed along with the source images. Automated exposure control  and iterative reconstruction methods were used.     FINDINGS:  VASCULAR FINDINGS: The subclavian, axial, brachial, radial, ulnar, and  interosseous arteries appear patent. No evidence of vascular  narrowing  or occlusion.     NONVASCULAR FINDINGS: Visualized structures within the chest appear  normal. There is a air and fluid collection identified in the volar  aspect of the forearm measuring 10.4 x 3.5 x 4.3 cm. This fluid  collection is within the musculature. There is mild overlying soft  tissue swelling in the subcutaneous fat. There is no obvious bone  erosion or destruction. There is no definite involvement of the elbow  joint.          Impression:         1. Abscess within the volar aspect of the musculature in the proximal  forearm measuring 10.4 x 3.5 x 4.3 cm.  2. No definite arterial involvement.     Electronically Signed By-Jeremy England MD On:11/16/2022 12:35 PM  This report was finalized on [id number] by  Jeremy England MD.            Assessment & Plan     Right forearm abscess    Admit on IV antibiotics.  We will plan on incision and drainage tomorrow.  Patient attends risk include bleeding, infection, damage to nerves or blood vessels, possible need for further surgery, and the risk of medical anesthetic complications include the risk of death.      Jeremy Galindo MD  11/16/22  19:16 EST

## 2022-11-17 NOTE — OP NOTE
INCISION AND DRAINAGE UPPER EXTREMITY  Procedure Report    Patient Name:  Zach Blandon  YOB: 1990    Date of Surgery:  11/17/2022         Pre-op Diagnosis: Right forearm abscess    Postop diagnosis: Same    Indication: 32-year-old IV drug user with recurrent right forearm abscess.  Needs to be drained to prevent sepsis      Procedure/CPT® Codes:      Procedure(s):  INCISION AND DRAINAGE UPPER EXTREMITY-FOREARM, right    Staff:  Surgeon(s):  Jeremy Galindo MD         Anesthesia: General    Estimated Blood Loss: 50 mL    Implants:    Nothing was implanted during the procedure    Specimen:          Culture        Findings: 200 cc of thick foul-smelling pus along the medial proximal forearm    Complications: None    Description of Procedure: Patient seen preoperative.  Notified the right forearm.  This initialed by me.  He was taken the OR.  He is already on antibiotics.  He had general anesthesia.  Had supine positioning with arm on a hand table.  Had sterile prep and drape of the right upper extremity.  Timeout was performed.  The 3 cm longitudinal incision made along medial proximal forearm.  Spreading was done with a hemostat with pus under pressure coming out.  This was then irrigated with normal saline with a bulb syringe and then packed with 1 inch iodoform Nu Gauze.  Sterile dressings were applied    Plan: Patient had daily wound packing and IV antibiotics        Jeremy Galindo MD     Date: 11/17/2022  Time: 07:55 EST

## 2022-11-17 NOTE — CASE MANAGEMENT/SOCIAL WORK
Continued Stay Note  BRENDEN Stinson     Patient Name: Zach Blandon  MRN: [id number]  Today's Date: 11/17/2022    Admit Date: 11/16/2022    Plan: D/C plan: Anticipate home, watch for IV abx.   Discharge Plan     Row Name 11/17/22 1636       Plan    Plan D/C plan: Anticipate home, watch for IV abx.    Patient/Family in Agreement with Plan yes    Plan Comments Met with pt at bedside to discuss possibility of needing IV abx after d/c. Discussed ambulatory care. Cultures pending, will depend on results to determine what abx are needed to treat and for how long. Discussed possibility of needing SNF for IV abx administration if abx is required more than once a day. Will follow up when cultures have resulted.                   Expected Discharge Date and Time     Expected Discharge Date Expected Discharge Time    Nov 19, 2022         Met with patient in room wearing PPE: mask  Maintained distance greater than six feet and spent less than 15 minutes in the room.          Max Mendez RN

## 2022-11-17 NOTE — ANESTHESIA POSTPROCEDURE EVALUATION
Patient: Zach Blandon    Procedure Summary     Date: 11/17/22 Room / Location: [facility] OR 02 / [facility] MAIN OR    Anesthesia Start: 0716 Anesthesia Stop: 0751    Procedure: INCISION AND DRAINAGE UPPER EXTREMITY-FOREARM (Right: Arm Lower) Diagnosis:     Surgeons: Jeremy Galindo MD Provider: Elkin Aranda MD    Anesthesia Type: general ASA Status: 3          Anesthesia Type: general    Vitals  Vitals Value Taken Time   /97 11/17/22 0859   Temp 98.1 °F (36.7 °C) 11/17/22 0854   Pulse 78 11/17/22 0859   Resp 12 11/17/22 0854   SpO2 98 % 11/17/22 0859   Vitals shown include unvalidated device data.        Post Anesthesia Care and Evaluation    Patient location during evaluation: PACU  Patient participation: complete - patient participated  Level of consciousness: awake  Pain scale: See nurse's notes for pain score.  Pain management: adequate    Airway patency: patent  Anesthetic complications: No anesthetic complications  PONV Status: none  Cardiovascular status: acceptable  Respiratory status: acceptable  Hydration status: acceptable    Comments: Patient seen and examined postoperatively; vital signs stable; SpO2 greater than or equal to 90%; cardiopulmonary status stable; nausea/vomiting adequately controlled; pain adequately controlled; no apparent anesthesia complications; patient discharged from anesthesia care when discharge criteria were met

## 2022-11-17 NOTE — PLAN OF CARE
Problem: Adult Inpatient Plan of Care  Goal: Plan of Care Review  Outcome: Ongoing, Progressing  Goal: Patient-Specific Goal (Individualized)  Outcome: Ongoing, Progressing  Goal: Absence of Hospital-Acquired Illness or Injury  Outcome: Ongoing, Progressing  Intervention: Identify and Manage Fall Risk  Recent Flowsheet Documentation  Taken 11/17/2022 0200 by Nasima Buckner LPN  Safety Promotion/Fall Prevention: safety round/check completed  Taken 11/17/2022 0005 by Nasima Buckner LPN  Safety Promotion/Fall Prevention: safety round/check completed  Intervention: Prevent Skin Injury  Recent Flowsheet Documentation  Taken 11/17/2022 0005 by Nasima Buckner LPN  Skin Protection: adhesive use limited  Intervention: Prevent Infection  Recent Flowsheet Documentation  Taken 11/17/2022 0200 by Nasima Buckner LPN  Infection Prevention:   single patient room provided   rest/sleep promoted   visitors restricted/screened  Taken 11/17/2022 0005 by Nasima Buckner LPN  Infection Prevention:   visitors restricted/screened   single patient room provided   rest/sleep promoted  Goal: Optimal Comfort and Wellbeing  Outcome: Ongoing, Progressing  Intervention: Monitor Pain and Promote Comfort  Recent Flowsheet Documentation  Taken 11/17/2022 0410 by Nasima Buckner LPN  Pain Management Interventions:   see MAR   quiet environment facilitated  Taken 11/17/2022 0334 by Nasima Buckner LPN  Pain Management Interventions:   quiet environment facilitated   see MAR  Taken 11/17/2022 0100 by Nasima Buckner LPN  Pain Management Interventions:   see MAR   quiet environment facilitated  Taken 11/17/2022 0005 by Nasima Buckner LPN  Pain Management Interventions:   quiet environment facilitated   see MAR  Intervention: Provide Person-Centered Care  Recent Flowsheet Documentation  Taken 11/17/2022 0005 by Nasima Buckner LPN  Trust Relationship/Rapport: care explained  Goal: Readiness for Transition of Care  Outcome: Ongoing,  Progressing     Problem: Adult Inpatient Plan of Care  Goal: Optimal Comfort and Wellbeing  Intervention: Monitor Pain and Promote Comfort  Recent Flowsheet Documentation  Taken 11/17/2022 0410 by Nasima Buckner LPN  Pain Management Interventions:   see MAR   quiet environment facilitated  Taken 11/17/2022 0334 by Nasima Buckner LPN  Pain Management Interventions:   quiet environment facilitated   see MAR  Taken 11/17/2022 0100 by Nasima Buckner LPN  Pain Management Interventions:   see MAR   quiet environment facilitated  Taken 11/17/2022 0005 by Nasima Buckner LPN  Pain Management Interventions:   quiet environment facilitated   see MAR   Goal Outcome Evaluation:

## 2022-11-17 NOTE — PLAN OF CARE
Goal Outcome Evaluation:              Outcome Evaluation: Patient to OR today for incision and drainage of right upper extremity, back to room 206 about 0845. Right upper extremity dressing intact, unable to visualize wound due to dressing, moderate amount of serroussanguinous drainage noted from dressing. RUE elevated on pillow x 1, ice pack applied. Right arm extremely swollen, red and hot to touch, right hand red, swollen and hot to touch, full sensation noted to right hand, unable to  with right hand, minimal range of motion noted in right hand. Tolerating regular diet. Mother at bedside. No distress noted, will continue to monitor.

## 2022-11-17 NOTE — PROGRESS NOTES
[facility] Medicine Services Daily Progress Note    Patient Name: Zcah Blandon  : 1990  MRN: [id number]  Primary Care Physician:  Provider, No Known  Date of admission: 2022      Subjective      Chief Complaint: Right upper extremity abscess      Patient Reports having some pain after surgery.  Elevating on 1 pillow.  On Dilaudid.  Awaiting cultures.    ROS negative except as above      Objective      Vitals:   Temp:  [97.1 °F (36.2 °C)-99.9 °F (37.7 °C)] 98.1 °F (36.7 °C)  Heart Rate:  [] 84  Resp:  [9-21] 16  BP: (116-157)/() 116/66  Flow (L/min):  [6] 6    Physical Exam  Vitals reviewed.   Constitutional:       Comments: Mom at bedside   HENT:      Head: Normocephalic.   Cardiovascular:      Rate and Rhythm: Normal rate.   Pulmonary:      Effort: Pulmonary effort is normal.   Abdominal:      General: Abdomen is flat.      Palpations: Abdomen is soft.   Musculoskeletal:         General: Normal range of motion.      Cervical back: Normal range of motion.      Right lower leg: Edema present.      Comments: Severe swelling right forearm with postop wrapping   Skin:     General: Skin is warm.   Neurological:      General: No focal deficit present.      Mental Status: He is alert and oriented to person, place, and time.   Psychiatric:         Mood and Affect: Mood normal.         Behavior: Behavior normal.             Result Review    Result Review:  I have personally reviewed the results from the time of this admission to 2022 15:31 EST and agree with these findings:  [x]  Laboratory  []  Microbiology  []  Radiology  []  EKG/Telemetry   []  Cardiology/Vascular   []  Pathology  []  Old records  []  Other:  Most notable findings include: White count 22    Wounds (last 24 hours)     LDA Wound     Row Name 22 1229 22 1102 22 1000       Wound 22 0733 Right proximal arm Incision    Wound - Properties Group Placement Date: 22  -AM  Placement Time: 0733  -AM Side: Right  -AM Orientation: proximal  -AM Location: arm  -AM Primary Wound Type: Incision  -AM    Dressing Appearance intact;moist drainage  -KH dry;intact;no drainage  -KH dry;intact;no drainage  -KH    Closure SOCORRO  -KH SOCORRO  -KH SOCORRO  -KH    Base dressing in place, unable to visualize  -KH dressing in place, unable to visualize  -KH dressing in place, unable to visualize  -KH    Drainage Amount moderate  -KH -- --    Retired Wound - Properties Group Placement Date: 11/17/22 -AM Placement Time: 0733  -AM Side: Right  -AM Orientation: proximal  -AM Location: arm  -AM Primary Wound Type: Incision  -AM    Retired Wound - Properties Group Date first assessed: 11/17/22 -AM Time first assessed: 0733  -AM Side: Right  -AM Location: arm  -AM Primary Wound Type: Incision  -AM    Row Name 11/17/22 0847 11/17/22 0832 11/17/22 0817       Wound 11/17/22 0733 Right proximal arm Incision    Wound - Properties Group Placement Date: 11/17/22 -AM Placement Time: 0733  -AM Side: Right  -AM Orientation: proximal  -AM Location: arm  -AM Primary Wound Type: Incision  -AM    Dressing Appearance dry;intact;no drainage  -NS dry;intact;no drainage  -NS dry;intact;no drainage  -NS    Closure SOCORRO  iodoform, fluffs, abd, webril, coban  -NS SOCORRO  iodoform, fluffs, abd, webril, coban  -NS SOCORRO  iodoform, fluffs, abd, webril, coban  -NS    Base dressing in place, unable to visualize  -NS dressing in place, unable to visualize  -NS dressing in place, unable to visualize  -NS    Retired Wound - Properties Group Placement Date: 11/17/22 -AM Placement Time: 0733  -AM Side: Right  -AM Orientation: proximal  -AM Location: arm  -AM Primary Wound Type: Incision  -AM    Retired Wound - Properties Group Date first assessed: 11/17/22  -AM Time first assessed: 0733  -AM Side: Right  -AM Location: arm  -AM Primary Wound Type: Incision  -AM    Row Name 11/17/22 0802 11/17/22 0747 11/17/22 0737       Wound 11/17/22 0733 Right  proximal arm Incision    Wound - Properties Group Placement Date: 11/17/22 -AM Placement Time: 0733  -AM Side: Right  -AM Orientation: proximal  -AM Location: arm  -AM Primary Wound Type: Incision  -AM    Dressing Appearance dry;intact;no drainage  -NS dry;intact;no drainage  -NS --    Closure SOCORRO  iodoform, fluffs, abd, webril, coban  -NS SOCORRO  iodoform, fluffs, abd, webril, coban  -NS --    Base dressing in place, unable to visualize  -NS dressing in place, unable to visualize  -NS --    Dressing Care -- -- dressing applied  iodoform packing, fluffs, abd, webril, coban  -AM    Retired Wound - Properties Group Placement Date: 11/17/22 -AM Placement Time: 0733  -AM Side: Right  -AM Orientation: proximal  -AM Location: arm  -AM Primary Wound Type: Incision  -AM    Retired Wound - Properties Group Date first assessed: 11/17/22  -AM Time first assessed: 0733  -AM Side: Right  -AM Location: arm  -AM Primary Wound Type: Incision  -AM          User Key  (r) = Recorded By, (t) = Taken By, (c) = Cosigned By    Initials Name Provider Type    AM Leslie Palmer RN Registered Nurse    NS Sprigler, Naomi, RN Registered Nurse     Teresita Zavaleta RN Registered Nurse                  Assessment & Plan      Brief Patient Summary:  Zach Blandon is a 32 y.o. male who presents with right forearm abscess status post I&D in the OR due to IV drug use      cefTRIAXone, 2 g, Intravenous, Q24H  polyethylene glycol, 17 g, Oral, Daily  sodium chloride, 10 mL, Intravenous, Q12H  vancomycin, 1,250 mg, Intravenous, Q12H       lactated ringers, 1,000 mL  Pharmacy to dose vancomycin,   sodium chloride, 125 mL/hr, Last Rate: 125 mL/hr (11/17/22 1001)         Active Hospital Problems:  Active Hospital Problems    Diagnosis    • **Abscess of arm, right      Plan:     Right arm abscess due to IV drug use  Status post I&D in the OR  Follow-up cultures from the OR  Continue pain control with Dilaudid  Elevate arm  Continue IV  antibiotics      DVT prophylaxis:  Mechanical DVT prophylaxis orders are present.    CODE STATUS:    Level Of Support Discussed With: Patient  Code Status (Patient has no pulse and is not breathing): CPR (Attempt to Resuscitate)  Medical Interventions (Patient has pulse or is breathing): Full      Disposition:  I expect patient to be discharged November 18 or 19.    This patient has been examined wearing appropriate Personal Protective Equipment and discussed with Patient and mom. 11/17/22      Electronically signed by Soren Garcia MD, 11/17/22, 15:31 EST.  [facility] Hospitalist Team

## 2022-11-18 LAB
ALBUMIN SERPL-MCNC: 2.9 G/DL (ref 3.5–5.2)
ALBUMIN/GLOB SERPL: 0.9 G/DL
ALP SERPL-CCNC: 98 U/L (ref 39–117)
ALT SERPL W P-5'-P-CCNC: 9 U/L (ref 1–41)
ANION GAP SERPL CALCULATED.3IONS-SCNC: 9 MMOL/L (ref 5–15)
AST SERPL-CCNC: 14 U/L (ref 1–40)
BILIRUB SERPL-MCNC: 0.3 MG/DL (ref 0–1.2)
BUN SERPL-MCNC: 5 MG/DL (ref 6–20)
BUN/CREAT SERPL: 9.8 (ref 7–25)
CALCIUM SPEC-SCNC: 8.2 MG/DL (ref 8.6–10.5)
CHLORIDE SERPL-SCNC: 99 MMOL/L (ref 98–107)
CO2 SERPL-SCNC: 29 MMOL/L (ref 22–29)
CREAT SERPL-MCNC: 0.51 MG/DL (ref 0.76–1.27)
CRP SERPL-MCNC: 23.67 MG/DL (ref 0–0.5)
DEPRECATED RDW RBC AUTO: 37.2 FL (ref 37–54)
EGFRCR SERPLBLD CKD-EPI 2021: 139 ML/MIN/1.73
EOSINOPHIL # BLD MANUAL: 0.21 10*3/MM3 (ref 0–0.4)
EOSINOPHIL NFR BLD MANUAL: 1 % (ref 0.3–6.2)
ERYTHROCYTE [DISTWIDTH] IN BLOOD BY AUTOMATED COUNT: 12.6 % (ref 12.3–15.4)
ERYTHROCYTE [SEDIMENTATION RATE] IN BLOOD: 36 MM/HR (ref 0–15)
GLOBULIN UR ELPH-MCNC: 3.1 GM/DL
GLUCOSE SERPL-MCNC: 101 MG/DL (ref 65–99)
HCT VFR BLD AUTO: 35.2 % (ref 37.5–51)
HGB BLD-MCNC: 11.4 G/DL (ref 13–17.7)
LYMPHOCYTES # BLD MANUAL: 1.48 10*3/MM3 (ref 0.7–3.1)
LYMPHOCYTES NFR BLD MANUAL: 6 % (ref 5–12)
MAGNESIUM SERPL-MCNC: 1.8 MG/DL (ref 1.6–2.6)
MCH RBC QN AUTO: 27.1 PG (ref 26.6–33)
MCHC RBC AUTO-ENTMCNC: 32.3 G/DL (ref 31.5–35.7)
MCV RBC AUTO: 84 FL (ref 79–97)
MONOCYTES # BLD: 1.27 10*3/MM3 (ref 0.1–0.9)
NEUTROPHILS # BLD AUTO: 18.23 10*3/MM3 (ref 1.7–7)
NEUTROPHILS NFR BLD MANUAL: 80 % (ref 42.7–76)
NEUTS BAND NFR BLD MANUAL: 6 % (ref 0–5)
PHOSPHATE SERPL-MCNC: 2.8 MG/DL (ref 2.5–4.5)
PLAT MORPH BLD: NORMAL
PLATELET # BLD AUTO: 372 10*3/MM3 (ref 140–450)
PMV BLD AUTO: 8.6 FL (ref 6–12)
POTASSIUM SERPL-SCNC: 3.8 MMOL/L (ref 3.5–5.2)
PROT SERPL-MCNC: 6 G/DL (ref 6–8.5)
RBC # BLD AUTO: 4.19 10*6/MM3 (ref 4.14–5.8)
RBC MORPH BLD: NORMAL
SCAN SLIDE: NORMAL
SODIUM SERPL-SCNC: 137 MMOL/L (ref 136–145)
VANCOMYCIN TROUGH SERPL-MCNC: 4.6 MCG/ML (ref 5–20)
VARIANT LYMPHS NFR BLD MANUAL: 7 % (ref 19.6–45.3)
WBC MORPH BLD: NORMAL
WBC NRBC COR # BLD: 21.2 10*3/MM3 (ref 3.4–10.8)

## 2022-11-18 PROCEDURE — 86140 C-REACTIVE PROTEIN: CPT | Performed by: INTERNAL MEDICINE

## 2022-11-18 PROCEDURE — 25010000002 HYDROMORPHONE 1 MG/ML SOLUTION: Performed by: ORTHOPAEDIC SURGERY

## 2022-11-18 PROCEDURE — 85652 RBC SED RATE AUTOMATED: CPT | Performed by: INTERNAL MEDICINE

## 2022-11-18 PROCEDURE — 25010000002 VANCOMYCIN HCL 1.25 G RECONSTITUTED SOLUTION 1 EACH VIAL: Performed by: ORTHOPAEDIC SURGERY

## 2022-11-18 PROCEDURE — 25010000002 MORPHINE PER 10 MG: Performed by: ORTHOPAEDIC SURGERY

## 2022-11-18 PROCEDURE — 25010000002 CEFTRIAXONE PER 250 MG: Performed by: ORTHOPAEDIC SURGERY

## 2022-11-18 PROCEDURE — 84100 ASSAY OF PHOSPHORUS: CPT | Performed by: INTERNAL MEDICINE

## 2022-11-18 PROCEDURE — 85007 BL SMEAR W/DIFF WBC COUNT: CPT | Performed by: ORTHOPAEDIC SURGERY

## 2022-11-18 PROCEDURE — 80202 ASSAY OF VANCOMYCIN: CPT | Performed by: INTERNAL MEDICINE

## 2022-11-18 PROCEDURE — 83735 ASSAY OF MAGNESIUM: CPT | Performed by: INTERNAL MEDICINE

## 2022-11-18 PROCEDURE — 85025 COMPLETE CBC W/AUTO DIFF WBC: CPT | Performed by: ORTHOPAEDIC SURGERY

## 2022-11-18 PROCEDURE — 80053 COMPREHEN METABOLIC PANEL: CPT | Performed by: INTERNAL MEDICINE

## 2022-11-18 PROCEDURE — 25010000002 VANCOMYCIN HCL 1.25 G RECONSTITUTED SOLUTION 1 EACH VIAL: Performed by: INTERNAL MEDICINE

## 2022-11-18 RX ADMIN — HYDROMORPHONE HYDROCHLORIDE 1 MG: 1 INJECTION, SOLUTION INTRAMUSCULAR; INTRAVENOUS; SUBCUTANEOUS at 19:03

## 2022-11-18 RX ADMIN — MORPHINE SULFATE 4 MG: 4 INJECTION, SOLUTION INTRAMUSCULAR; INTRAVENOUS at 13:43

## 2022-11-18 RX ADMIN — HYDROCODONE BITARTRATE AND ACETAMINOPHEN 1 TABLET: 7.5; 325 TABLET ORAL at 02:50

## 2022-11-18 RX ADMIN — MORPHINE SULFATE 4 MG: 4 INJECTION, SOLUTION INTRAMUSCULAR; INTRAVENOUS at 00:51

## 2022-11-18 RX ADMIN — HYDROMORPHONE HYDROCHLORIDE 1 MG: 1 INJECTION, SOLUTION INTRAMUSCULAR; INTRAVENOUS; SUBCUTANEOUS at 02:50

## 2022-11-18 RX ADMIN — OXYCODONE 10 MG: 5 TABLET ORAL at 09:41

## 2022-11-18 RX ADMIN — VANCOMYCIN HYDROCHLORIDE 1250 MG: 1.25 INJECTION, POWDER, LYOPHILIZED, FOR SOLUTION INTRAVENOUS at 21:57

## 2022-11-18 RX ADMIN — VANCOMYCIN HYDROCHLORIDE 1250 MG: 1.25 INJECTION, POWDER, LYOPHILIZED, FOR SOLUTION INTRAVENOUS at 12:17

## 2022-11-18 RX ADMIN — HYDROMORPHONE HYDROCHLORIDE 1 MG: 1 INJECTION, SOLUTION INTRAMUSCULAR; INTRAVENOUS; SUBCUTANEOUS at 16:04

## 2022-11-18 RX ADMIN — MORPHINE SULFATE 4 MG: 4 INJECTION, SOLUTION INTRAMUSCULAR; INTRAVENOUS at 05:29

## 2022-11-18 RX ADMIN — OXYCODONE 10 MG: 5 TABLET ORAL at 05:29

## 2022-11-18 RX ADMIN — Medication 10 ML: at 19:04

## 2022-11-18 RX ADMIN — ACETAMINOPHEN 650 MG: 325 TABLET, FILM COATED ORAL at 00:51

## 2022-11-18 RX ADMIN — Medication 10 ML: at 09:00

## 2022-11-18 RX ADMIN — MORPHINE SULFATE 4 MG: 4 INJECTION, SOLUTION INTRAMUSCULAR; INTRAVENOUS at 18:09

## 2022-11-18 RX ADMIN — HYDROCODONE BITARTRATE AND ACETAMINOPHEN 1 TABLET: 7.5; 325 TABLET ORAL at 21:00

## 2022-11-18 RX ADMIN — HYDROMORPHONE HYDROCHLORIDE 1 MG: 1 INJECTION, SOLUTION INTRAMUSCULAR; INTRAVENOUS; SUBCUTANEOUS at 07:41

## 2022-11-18 RX ADMIN — CEFTRIAXONE 2 G: 2 INJECTION, POWDER, FOR SOLUTION INTRAMUSCULAR; INTRAVENOUS at 09:43

## 2022-11-18 RX ADMIN — HYDROMORPHONE HYDROCHLORIDE 1 MG: 1 INJECTION, SOLUTION INTRAMUSCULAR; INTRAVENOUS; SUBCUTANEOUS at 11:44

## 2022-11-18 RX ADMIN — Medication 10 ML: at 21:00

## 2022-11-18 RX ADMIN — OXYCODONE 10 MG: 5 TABLET ORAL at 18:09

## 2022-11-18 RX ADMIN — POLYETHYLENE GLYCOL 3350 17 G: 17 POWDER, FOR SOLUTION ORAL at 09:40

## 2022-11-18 RX ADMIN — OXYCODONE 10 MG: 5 TABLET ORAL at 13:42

## 2022-11-18 RX ADMIN — HYDROMORPHONE HYDROCHLORIDE 1 MG: 1 INJECTION, SOLUTION INTRAMUSCULAR; INTRAVENOUS; SUBCUTANEOUS at 21:56

## 2022-11-18 RX ADMIN — HYDROCODONE BITARTRATE AND ACETAMINOPHEN 1 TABLET: 7.5; 325 TABLET ORAL at 07:41

## 2022-11-18 RX ADMIN — HYDROCODONE BITARTRATE AND ACETAMINOPHEN 1 TABLET: 7.5; 325 TABLET ORAL at 16:04

## 2022-11-18 RX ADMIN — MORPHINE SULFATE 4 MG: 4 INJECTION, SOLUTION INTRAMUSCULAR; INTRAVENOUS at 09:41

## 2022-11-18 RX ADMIN — OXYCODONE 10 MG: 5 TABLET ORAL at 00:51

## 2022-11-18 RX ADMIN — HYDROCODONE BITARTRATE AND ACETAMINOPHEN 1 TABLET: 7.5; 325 TABLET ORAL at 11:44

## 2022-11-18 NOTE — CASE MANAGEMENT/SOCIAL WORK
Social Work Assessment  [facility]     Patient Name: Zach Blandon  MRN: [id number]  Today's Date: 11/18/2022    Admit Date: 11/16/2022     Discharge Plan     Row Name 11/18/22 1528       Plan    Plan DC Plan: Anticipate return home with mother, watch for IV abx. [facility] PCP 11/28 @ 10:15AM, added to AVS. Mother for transport at d/c.    Patient/Family in Agreement with Plan yes    Plan Comments During substance abuse screening, LSW discussed arranging a PCP appointment. Confirmed patient has Passport, but plans to remain in [place] living with his mother. Agreeable to [facility] PCP, see above regarding details.            Substance Abuse     Row Name 11/18/22 1525       Substance Use    Substance Use Status current tobacco use;current caffeine use;current street drug/inhalant/medication abuse    Last Tobacco Use Date 11/16/22    Environment Typically Uses Tobacco private residence    Readiness to Change Tobacco Use precontemplation    Attempts to Quit Tobacco Use none    Longest Period Tobacco-Free N/A    Caffeine Type pop/soda    Caffeine Amount/Frequency 7-9 cups/cans per day    Last Street Drug/Medication/Inhalant Use 11/13/22  per chart 3 days prior to admission, pt declines use    Environment Typically Uses Street Drugs alone    Reported Characteristics of Street Drugs unsuccessful efforts to stop    Attempts to Quit Street Drug Use other (see comments)  unknown    Longest Period of Street Drug Sobriety n/a    Substance Use Comment LSW consulted d/t reported substance abuse. Per ED provider note, patient reported history of sobriety with a recent relapse three days prior to admission, when he attempted to use IV heroin in right arm. Met with patient at bedside, introduced self/role. Patient’s mother present at bedside, permission obtained to speak with mother present. Patient reports current tobacco use (10-15years), of ½-1 PPD. At present time, patient declined cessation resources. Reports no current ETOH  use. Declined all illicit substance use, which LSW did not inquire further on d/t visitor present. Reports drinking large amount of Coke per day (could drink up to 12 per patient).       AUDIT-C (Alcohol Use Disorders ID Test)    Q1: How often do you have a drink containing alcohol? Never    Q2: How many drinks containing alcohol do you have on a typical day when you are drinking? None    Q3: How often do you have six or more drinks on one occasion? Never    Audit-C Score 0           Met with patient in room wearing PPE: mask  Maintained distance greater than six feet and spent less than 15 minutes in the room.    TANA Amador    Phone: [phone]  Cell: [phone]  Fax: [phone]  [email or web address]

## 2022-11-18 NOTE — PROGRESS NOTES
LOS: 2 days   Patient Care Team:  Provider, No Known as PCP - General  Provider, No Known as PCP - Family Medicine        Subjective     Postop day 1 from left knee bursa excision, pain moderate      Objective     Vital Signs  Vitals:    11/17/22 1602 11/17/22 2000 11/18/22 0000 11/18/22 0322   BP: 118/70 140/73 117/52 142/80   BP Location:    Left arm   Patient Position:    Lying   Pulse: 88 92 90 72   Resp: 15 15 14 12   Temp: 98.4 °F (36.9 °C) 99.1 °F (37.3 °C) 99.5 °F (37.5 °C) 98.9 °F (37.2 °C)   TempSrc: Oral Oral Oral Oral   SpO2: 98% 100% 96% 97%   Weight:       Height:           Physical Exam:   Alert and orient x3 sitting up on edge of bed  She has been changed     Results Review:     I reviewed the patient's new clinical results.  I reviewed the patient's new imaging results    Lab Results (last 24 hours)     Procedure Component Value Units Date/Time    Wound Culture - Wound, Forearm, Right [[id number]] Collected: 11/17/22 0739    Specimen: Wound from Forearm, Right Updated: 11/17/22 1156     Gram Stain Many (4+) Mixed bacterial morphotypes seen on Gram Stain      Moderate (3+) WBCs per low power field      Few (2+) Epithelial cells per low power field    Blood Culture - Blood, Arm, Left [[id number]]  (Normal) Collected: 11/16/22 1016    Specimen: Blood from Arm, Left Updated: 11/17/22 1031     Blood Culture No growth at 24 hours    Blood Culture - Blood, Arm, Left [[id number]]  (Normal) Collected: 11/16/22 0946    Specimen: Blood from Arm, Left Updated: 11/17/22 1001     Blood Culture No growth at 24 hours        Imaging Results (Last 24 Hours)     ** No results found for the last 24 hours. **            Medication Review:   Scheduled Meds:cefTRIAXone, 2 g, Intravenous, Q24H  polyethylene glycol, 17 g, Oral, Daily  sodium chloride, 10 mL, Intravenous, Q12H  vancomycin, 1,250 mg, Intravenous, Q12H      Continuous Infusions:lactated ringers, 1,000 mL  Pharmacy to dose vancomycin,       PRN Meds:.•   acetaminophen  •  HYDROcodone-acetaminophen  •  HYDROmorphone  •  Morphine **AND** naloxone  •  nitroglycerin  •  ondansetron **OR** ondansetron  •  oxyCODONE  •  Pharmacy to dose vancomycin  •  sodium chloride  •  [COMPLETED] Insert Peripheral IV **AND** sodium chloride  •  sodium chloride      Assessment & Plan     Stable    Plan for disposition: Okay from my standpoint to discharge home on Keflex 500 mg every 6 hours for 14 days  May remove dressing and shower postop day 5.  Return to see me 12 to 14 days postop may return to work if keeps clean and does not kneel on        Jeremy Galindo MD  11/18/22  08:17 EST

## 2022-11-18 NOTE — PROGRESS NOTES
"Pharmacy Antimicrobial Dosing Service    Subjective:  Zach Blandon is a 32 y.o.male admitted with abscess of the arm. Pharmacy has been consulted to dose Vancomycin for possible sepsis/SSTI.    PMH: recovering IV drug user, but relapsed 3 days PTA.     S/p I&D of right upper forearm abscess 11/17    Assessment/Plan    1. Day #3 Vancomycin: Goal -600 mcg*h/mL. Current regimen 1250mg (19mg/kg ABW) IV q12h. Trough level prior to 5th total dose resulted at 4.6mcg/mL, with subtherapeutic AUC. Will increase to 1250mg IV q8h, for predicted therapeutic AUC of 532 mcg/mL/hr. Will collect trough prior to the 4th dose of new regimen 11/19 @ 1100.     2. Day #2 Ceftriaxone: 2g IV q24h    Will continue to monitor drug levels, renal function, culture and sensitivities, and patient clinical status.       Objective:  Relevant clinical data and objective history reviewed:  177.8 cm (70\")   65.9 kg (145 lb 4.5 oz)   Ideal body weight: 73 kg (160 lb 15 oz)  Body mass index is 20.85 kg/m².    Results from last 7 days   Lab Units 11/18/22  1215   VANCOMYCIN TR mcg/mL 4.60*     Results from last 7 days   Lab Units 11/18/22  1215 11/17/22  0358 11/16/22  1016   CREATININE mg/dL 0.51* 0.60* 0.79     Estimated Creatinine Clearance: 195.6 mL/min (A) (by C-G formula based on SCr of 0.51 mg/dL (L)).  I/O last 3 completed shifts:  In: 1370 [P.O.:420; I.V.:950]  Out: 525 [Urine:525]    Results from last 7 days   Lab Units 11/18/22  1215 11/17/22  0358 11/16/22  1016   WBC 10*3/mm3 21.20* 22.30* 27.40*     Temperature    11/18/22 0000 11/18/22 0322 11/18/22 0856   Temp: 99.5 °F (37.5 °C) 98.9 °F (37.2 °C) 98.5 °F (36.9 °C)     Baseline culture/source/susceptibility:  Microbiology Results (last 10 days)       Procedure Component Value - Date/Time    Wound Culture - Wound, Forearm, Right [[id number]] Collected: 11/17/22 0704    Lab Status: Preliminary result Specimen: Wound from Forearm, Right Updated: 11/17/22 1153     Gram Stain Many (4+) " Mixed bacterial morphotypes seen on Gram Stain      Moderate (3+) WBCs per low power field      Few (2+) Epithelial cells per low power field    Blood Culture - Blood, Arm, Left [[id number]]  (Normal) Collected: 11/16/22 1016    Lab Status: Preliminary result Specimen: Blood from Arm, Left Updated: 11/18/22 1031     Blood Culture No growth at 2 days    Blood Culture - Blood, Arm, Left [[id number]]  (Normal) Collected: 11/16/22 0946    Lab Status: Preliminary result Specimen: Blood from Arm, Left Updated: 11/18/22 1001     Blood Culture No growth at 2 days            Anti-Infectives (From admission, onward)      Ordered     Dose/Rate Route Frequency Start Stop    11/18/22 1501  Vancomycin HCl 1,250 mg in sodium chloride 0.9 % 250 mL IVPB        Ordering Provider: Soren Garcia MD    1,250 mg Intravenous Every 8 Hours 11/18/22 2000 11/23/22 1959    11/17/22 0803  cefTRIAXone (ROCEPHIN) 2 g in sodium chloride 0.9 % 100 mL IVPB        Ordering Provider: Jeremy Galindo MD    2 g  200 mL/hr over 30 Minutes Intravenous Every 24 Hours 11/17/22 1030 11/22/22 1029    11/16/22 1523  Pharmacy to dose vancomycin        Ordering Provider: Jeremy Galindo MD     Does not apply Continuous PRN 11/16/22 1523 11/23/22 1522    11/16/22 0925  Vancomycin HCl 1,250 mg in sodium chloride 0.9 % 250 mL IVPB        Ordering Provider: Ashley Milian APRN    20 mg/kg × 65.9 kg Intravenous Once 11/16/22 1030 11/16/22 1111    11/16/22 0925  piperacillin-tazobactam (ZOSYN) IVPB 3.375 g in 100 mL NS (CD)        Ordering Provider: Ashley Milian APRN    3.375 g  over 30 Minutes Intravenous Once 11/16/22 0927 11/16/22 1111            Gillian Oneill PharmD  11/18/22 15:02 EST

## 2022-11-18 NOTE — PROGRESS NOTES
[facility] Medicine Services Daily Progress Note    Patient Name: Zach Blandon  : 1990  MRN: [id number]  Primary Care Physician:  Provider, No Known  Date of admission: 2022      Subjective      Chief Complaint: Right upper extremity abscess        Patient Reports having some pain after surgery.  Elevating on 1 pillow.  On Dilaudid.  Awaiting cultures.  IV fluids discontinued to assist with swelling     ROS negative except as above       Objective      Vitals:   Temp:  [98.5 °F (36.9 °C)-99.5 °F (37.5 °C)] 99.4 °F (37.4 °C)  Heart Rate:  [72-92] 82  Resp:  [12-19] 19  BP: (117-142)/(52-80) 120/67    Physical Exam  Vitals reviewed.   Constitutional:       Comments: Mom at bedside   HENT:      Head: Normocephalic.   Cardiovascular:      Rate and Rhythm: Normal rate.   Pulmonary:      Effort: Pulmonary effort is normal.   Abdominal:      General: Abdomen is flat.      Palpations: Abdomen is soft.   Musculoskeletal:         General: Normal range of motion.      Cervical back: Normal range of motion.      Right lower leg: Edema present.      Comments: Severe swelling right forearm with postop wrapping   Skin:     General: Skin is warm.   Neurological:      General: No focal deficit present.      Mental Status: He is alert and oriented to person, place, and time.   Psychiatric:         Mood and Affect: Mood normal.         Behavior: Behavior normal.        Result Review    Result Review:  I have personally reviewed the results from the time of this admission to 2022 16:07 EST and agree with these findings:  [x]  Laboratory  []  Microbiology  []  Radiology  []  EKG/Telemetry   []  Cardiology/Vascular   []  Pathology  []  Old records  []  Other:  Most notable findings include: White count 21.7 CRP 23    Wounds (last 24 hours)     LDA Wound     Row Name 22             Wound 22 0733 Right proximal arm Incision    Wound - Properties Group Placement Date: 22   -AM Placement Time: 0733 -AM Side: Right  -AM Orientation: proximal  -AM Location: arm  -AM Primary Wound Type: Incision  -AM    Dressing Appearance moist drainage;intact  -CH      Closure SOCORRO  -CH      Base dressing in place, unable to visualize  -CH      Drainage Characteristics/Odor serosanguineous  -CH      Drainage Amount moderate  -CH      Dressing Care dressing applied  -CH      Retired Wound - Properties Group Placement Date: 11/17/22 -AM Placement Time: 0733  -AM Side: Right  -AM Orientation: proximal  -AM Location: arm  -AM Primary Wound Type: Incision  -AM    Retired Wound - Properties Group Date first assessed: 11/17/22  -AM Time first assessed: 0733  -AM Side: Right  -AM Location: arm  -AM Primary Wound Type: Incision  -AM          User Key  (r) = Recorded By, (t) = Taken By, (c) = Cosigned By    Initials Name Provider Type    AM Leslie Palmer RN Registered Nurse    Lindy Medel LPN Licensed Nurse                   Assessment & Plan       Brief Patient Summary:  Zach Blandon is a 32 y.o. male who presents with right forearm abscess status post I&D in the OR due to IV drug use        cefTRIAXone, 2 g, Intravenous, Q24H  polyethylene glycol, 17 g, Oral, Daily  sodium chloride, 10 mL, Intravenous, Q12H  vancomycin, 1,250 mg, Intravenous, Q12H        lactated ringers, 1,000 mL  Pharmacy to dose vancomycin,   sodium chloride, 125 mL/hr, Last Rate: 125 mL/hr (11/17/22 1001)           Active Hospital Problems:       Active Hospital Problems     Diagnosis     • **Abscess of arm, right        Plan:      Right arm abscess due to IV drug use  Status post I&D in the OR  Follow-up cultures from the OR  Continue pain control with Dilaudid  Elevate arm  Continue IV antibiotics and aggressive pain control        Discontinue IV fluids    DVT prophylaxis:  Mechanical DVT prophylaxis orders are present.     CODE STATUS:    Level Of Support Discussed With: Patient  Code Status (Patient has no pulse and is  not breathing): CPR (Attempt to Resuscitate)  Medical Interventions (Patient has pulse or is breathing): Full        Disposition:  I expect patient to be discharged nov 19.     This patient has been examined wearing appropriate Personal Protective Equipment and discussed with Patient and mom.  11/18/22      Electronically signed by Soren Garcia MD, 11/18/22, 16:07 EST.  [facility] Hospitalist Team

## 2022-11-18 NOTE — PROGRESS NOTES
LOS: 2 days   Patient Care Team:  Provider, No Known as PCP - General  Provider, No Known as PCP - Family Medicine        Subjective     Postop day 1 from incision and drainage right forearm abscess, still sore but better      Objective     Vital Signs  Vitals:    11/17/22 1602 11/17/22 2000 11/18/22 0000 11/18/22 0322   BP: 118/70 140/73 117/52 142/80   BP Location:    Left arm   Patient Position:    Lying   Pulse: 88 92 90 72   Resp: 15 15 14 12   Temp: 98.4 °F (36.9 °C) 99.1 °F (37.3 °C) 99.5 °F (37.5 °C) 98.9 °F (37.2 °C)   TempSrc: Oral Oral Oral Oral   SpO2: 98% 100% 96% 97%   Weight:       Height:           Physical Exam:   Alert and orient x3  Dressing intact  Moving fingers well  Cultures pending       Results Review:     I reviewed the patient's new clinical results.  I reviewed the patient's new imaging results    Lab Results (last 24 hours)     Procedure Component Value Units Date/Time    Wound Culture - Wound, Forearm, Right [[id number]] Collected: 11/17/22 0739    Specimen: Wound from Forearm, Right Updated: 11/17/22 1156     Gram Stain Many (4+) Mixed bacterial morphotypes seen on Gram Stain      Moderate (3+) WBCs per low power field      Few (2+) Epithelial cells per low power field    Blood Culture - Blood, Arm, Left [[id number]]  (Normal) Collected: 11/16/22 1016    Specimen: Blood from Arm, Left Updated: 11/17/22 1031     Blood Culture No growth at 24 hours    Blood Culture - Blood, Arm, Left [[id number]]  (Normal) Collected: 11/16/22 0946    Specimen: Blood from Arm, Left Updated: 11/17/22 1001     Blood Culture No growth at 24 hours        Imaging Results (Last 24 Hours)     ** No results found for the last 24 hours. **            Medication Review:   Scheduled Meds:cefTRIAXone, 2 g, Intravenous, Q24H  polyethylene glycol, 17 g, Oral, Daily  sodium chloride, 10 mL, Intravenous, Q12H  vancomycin, 1,250 mg, Intravenous, Q12H      Continuous Infusions:lactated ringers, 1,000 mL  Pharmacy to dose  vancomycin,       PRN Meds:.•  acetaminophen  •  HYDROcodone-acetaminophen  •  HYDROmorphone  •  Morphine **AND** naloxone  •  nitroglycerin  •  ondansetron **OR** ondansetron  •  oxyCODONE  •  Pharmacy to dose vancomycin  •  sodium chloride  •  [COMPLETED] Insert Peripheral IV **AND** sodium chloride  •  sodium chloride      Assessment & Plan     Stable from right forearm abscess    Plan for disposition: 2 more days of IV antibiotics and possibly home on p.o.    Jeremy Galindo MD  11/18/22  08:27 EST

## 2022-11-18 NOTE — PLAN OF CARE
Goal Outcome Evaluation:  Plan of Care Reviewed With: patient        Progress: no change   Pt up all night with c/o pain see mar. Pt hand and arm swollen, red, and hot to touch. Pt VSS. Family at bedside will cont to monitor.

## 2022-11-19 LAB
ALBUMIN SERPL-MCNC: 3 G/DL (ref 3.5–5.2)
ALBUMIN/GLOB SERPL: 1.2 G/DL
ALP SERPL-CCNC: 102 U/L (ref 39–117)
ALT SERPL W P-5'-P-CCNC: 9 U/L (ref 1–41)
ANION GAP SERPL CALCULATED.3IONS-SCNC: 10 MMOL/L (ref 5–15)
AST SERPL-CCNC: 13 U/L (ref 1–40)
BILIRUB SERPL-MCNC: 0.3 MG/DL (ref 0–1.2)
BUN SERPL-MCNC: 5 MG/DL (ref 6–20)
BUN/CREAT SERPL: 10 (ref 7–25)
CALCIUM SPEC-SCNC: 8.2 MG/DL (ref 8.6–10.5)
CHLORIDE SERPL-SCNC: 98 MMOL/L (ref 98–107)
CO2 SERPL-SCNC: 28 MMOL/L (ref 22–29)
CREAT SERPL-MCNC: 0.5 MG/DL (ref 0.76–1.27)
CRP SERPL-MCNC: 16.62 MG/DL (ref 0–0.5)
EGFRCR SERPLBLD CKD-EPI 2021: 139.8 ML/MIN/1.73
GLOBULIN UR ELPH-MCNC: 2.6 GM/DL
GLUCOSE SERPL-MCNC: 110 MG/DL (ref 65–99)
MAGNESIUM SERPL-MCNC: 1.8 MG/DL (ref 1.6–2.6)
PHOSPHATE SERPL-MCNC: 4.4 MG/DL (ref 2.5–4.5)
POTASSIUM SERPL-SCNC: 3.9 MMOL/L (ref 3.5–5.2)
PROT SERPL-MCNC: 5.6 G/DL (ref 6–8.5)
SODIUM SERPL-SCNC: 136 MMOL/L (ref 136–145)
VANCOMYCIN TROUGH SERPL-MCNC: 15.9 MCG/ML (ref 5–20)

## 2022-11-19 PROCEDURE — 84100 ASSAY OF PHOSPHORUS: CPT | Performed by: INTERNAL MEDICINE

## 2022-11-19 PROCEDURE — 86140 C-REACTIVE PROTEIN: CPT | Performed by: INTERNAL MEDICINE

## 2022-11-19 PROCEDURE — 80053 COMPREHEN METABOLIC PANEL: CPT | Performed by: INTERNAL MEDICINE

## 2022-11-19 PROCEDURE — 80202 ASSAY OF VANCOMYCIN: CPT | Performed by: INTERNAL MEDICINE

## 2022-11-19 PROCEDURE — 25010000002 VANCOMYCIN HCL 1.25 G RECONSTITUTED SOLUTION 1 EACH VIAL: Performed by: INTERNAL MEDICINE

## 2022-11-19 PROCEDURE — 83735 ASSAY OF MAGNESIUM: CPT | Performed by: INTERNAL MEDICINE

## 2022-11-19 PROCEDURE — 25010000002 HYDROMORPHONE 1 MG/ML SOLUTION: Performed by: ORTHOPAEDIC SURGERY

## 2022-11-19 PROCEDURE — 25010000002 CEFTRIAXONE PER 250 MG: Performed by: ORTHOPAEDIC SURGERY

## 2022-11-19 RX ADMIN — HYDROMORPHONE HYDROCHLORIDE 1 MG: 1 INJECTION, SOLUTION INTRAMUSCULAR; INTRAVENOUS; SUBCUTANEOUS at 13:18

## 2022-11-19 RX ADMIN — HYDROMORPHONE HYDROCHLORIDE 1 MG: 1 INJECTION, SOLUTION INTRAMUSCULAR; INTRAVENOUS; SUBCUTANEOUS at 08:31

## 2022-11-19 RX ADMIN — HYDROMORPHONE HYDROCHLORIDE 1 MG: 1 INJECTION, SOLUTION INTRAMUSCULAR; INTRAVENOUS; SUBCUTANEOUS at 22:47

## 2022-11-19 RX ADMIN — HYDROMORPHONE HYDROCHLORIDE 1 MG: 1 INJECTION, SOLUTION INTRAMUSCULAR; INTRAVENOUS; SUBCUTANEOUS at 18:11

## 2022-11-19 RX ADMIN — VANCOMYCIN HYDROCHLORIDE 1250 MG: 1.25 INJECTION, POWDER, LYOPHILIZED, FOR SOLUTION INTRAVENOUS at 20:17

## 2022-11-19 RX ADMIN — OXYCODONE 10 MG: 5 TABLET ORAL at 04:00

## 2022-11-19 RX ADMIN — Medication 10 ML: at 08:31

## 2022-11-19 RX ADMIN — OXYCODONE 10 MG: 5 TABLET ORAL at 00:29

## 2022-11-19 RX ADMIN — HYDROMORPHONE HYDROCHLORIDE 1 MG: 1 INJECTION, SOLUTION INTRAMUSCULAR; INTRAVENOUS; SUBCUTANEOUS at 11:03

## 2022-11-19 RX ADMIN — OXYCODONE 10 MG: 5 TABLET ORAL at 20:17

## 2022-11-19 RX ADMIN — CEFTRIAXONE 2 G: 2 INJECTION, POWDER, FOR SOLUTION INTRAMUSCULAR; INTRAVENOUS at 10:54

## 2022-11-19 RX ADMIN — Medication 10 ML: at 22:47

## 2022-11-19 RX ADMIN — HYDROMORPHONE HYDROCHLORIDE 1 MG: 1 INJECTION, SOLUTION INTRAMUSCULAR; INTRAVENOUS; SUBCUTANEOUS at 05:58

## 2022-11-19 RX ADMIN — VANCOMYCIN HYDROCHLORIDE 1250 MG: 1.25 INJECTION, POWDER, LYOPHILIZED, FOR SOLUTION INTRAVENOUS at 13:14

## 2022-11-19 RX ADMIN — HYDROMORPHONE HYDROCHLORIDE 1 MG: 1 INJECTION, SOLUTION INTRAMUSCULAR; INTRAVENOUS; SUBCUTANEOUS at 01:51

## 2022-11-19 RX ADMIN — VANCOMYCIN HYDROCHLORIDE 1250 MG: 1.25 INJECTION, POWDER, LYOPHILIZED, FOR SOLUTION INTRAVENOUS at 05:58

## 2022-11-19 RX ADMIN — OXYCODONE 10 MG: 5 TABLET ORAL at 15:24

## 2022-11-19 NOTE — PLAN OF CARE
Goal Outcome Evaluation:              Outcome Evaluation: Patient alert and oriented x 4, vital signs stable. Mother at bedside. complains of pain of 10/10 entire shift. right arm elevated on pillow x 2, See MAR for pharmacologic pain control. Ice to arm, patient states it doesn't help and makes things worse. 1st post op dressing change done, iodiform 1 inch guaze removed from incision, large amount bloody drainage noted from incision, repacked with 1 inch iodiform gauze, patient tolerated dressing change, but stated the pain was severe with dressing change. right arm and hand edematous, red, hot to touch with minimal range of motion noted to hand, fingers and forearm, unable to  with hand, radial pulse palpable and strong, capillary refill time less than 3 seconds right finger tips. IV antibiotics given as ordered. No distress noted, will continue to monitor.

## 2022-11-19 NOTE — NURSING NOTE
Unable to obtain lab draws for CBC and sedimentation rate. Phlebotomy has tried 2 different times. IV/PICC team also tried and used ultrasound guided. All were unsuccessful. MD notified.

## 2022-11-19 NOTE — PROGRESS NOTES
LOS: 3 days   Patient Care Team:  Provider, No Known as PCP - General  Provider, No Known as PCP - Family Medicine        Subjective     Painful but improved      Objective     Vital Signs  Vitals:    11/18/22 1500 11/18/22 2001 11/18/22 2326 11/19/22 0413   BP: 120/67 125/71 126/76 135/76   BP Location: Left arm Left arm Left arm Left arm   Patient Position: Lying Sitting Sitting Lying   Pulse: 82 83 81 80   Resp: 19 13 11 13   Temp: 99.4 °F (37.4 °C) 99.8 °F (37.7 °C) 98.4 °F (36.9 °C) 99 °F (37.2 °C)   TempSrc: Oral Oral Oral Oral   SpO2: 97% 99% 100% 100%   Weight:       Height:           Physical Exam:   Alert and orient x3  Moving fingers well  Dressing clean dry and intact  Cultures negative     Results Review:     I reviewed the patient's new clinical results.  I reviewed the patient's new imaging results    Lab Results (last 24 hours)     Procedure Component Value Units Date/Time    Phosphorus [[id number]]  (Normal) Collected: 11/19/22 0204    Specimen: Blood Updated: 11/19/22 0303     Phosphorus 4.4 mg/dL     Comprehensive Metabolic Panel [[id number]]  (Abnormal) Collected: 11/19/22 0204    Specimen: Blood Updated: 11/19/22 0253     Glucose 110 mg/dL      BUN 5 mg/dL      Creatinine 0.50 mg/dL      Sodium 136 mmol/L      Potassium 3.9 mmol/L      Chloride 98 mmol/L      CO2 28.0 mmol/L      Calcium 8.2 mg/dL      Total Protein 5.6 g/dL      Albumin 3.00 g/dL      ALT (SGPT) 9 U/L      AST (SGOT) 13 U/L      Alkaline Phosphatase 102 U/L      Total Bilirubin 0.3 mg/dL      Globulin 2.6 gm/dL      A/G Ratio 1.2 g/dL      BUN/Creatinine Ratio 10.0     Anion Gap 10.0 mmol/L      eGFR 139.8 mL/min/1.73      Comment: National Kidney Foundation and American Society of Nephrology (ASN) Task Force recommended calculation based on the Chronic Kidney Disease Epidemiology Collaboration (CKD-EPI) equation refit without adjustment for race.       Narrative:      GFR Normal >60  Chronic Kidney Disease <60  Kidney  Failure <15      C-reactive Protein [[id number]]  (Abnormal) Collected: 11/19/22 0204    Specimen: Blood Updated: 11/19/22 0252     C-Reactive Protein 16.62 mg/dL     Magnesium [[id number]]  (Normal) Collected: 11/19/22 0204    Specimen: Blood Updated: 11/19/22 0252     Magnesium 1.8 mg/dL     CBC & Differential [[id number]] Collected: 11/19/22 0204    Specimen: Blood Updated: 11/19/22 0238    Narrative:      The following orders were created for panel order CBC & Differential.  Procedure                               Abnormality         Status                     ---------                               -----------         ------                     CBC Auto Differential[[id number]]                                                       Scan Slide[[id number]]                                                                    Please view results for these tests on the individual orders.    Sedimentation Rate [[id number]] Updated: 11/19/22 0238    Specimen: Blood     CBC Auto Differential [[id number]] Updated: 11/19/22 0238    Specimen: Blood     Manual Differential [[id number]]  (Abnormal) Collected: 11/18/22 1215    Specimen: Blood Updated: 11/18/22 1301     Neutrophil % 80.0 %      Lymphocyte % 7.0 %      Monocyte % 6.0 %      Eosinophil % 1.0 %      Bands %  6.0 %      Neutrophils Absolute 18.23 10*3/mm3      Lymphocytes Absolute 1.48 10*3/mm3      Monocytes Absolute 1.27 10*3/mm3      Eosinophils Absolute 0.21 10*3/mm3      RBC Morphology Normal     WBC Morphology Normal     Platelet Morphology Normal    CBC & Differential [[id number]]  (Abnormal) Collected: 11/18/22 1215    Specimen: Blood Updated: 11/18/22 1301    Narrative:      The following orders were created for panel order CBC & Differential.  Procedure                               Abnormality         Status                     ---------                               -----------         ------                     CBC Auto Differential[[id number]]        Abnormal             Final result               Scan Slide[[id number]]                                       Final result                 Please view results for these tests on the individual orders.    Scan Slide [[id number]] Collected: 11/18/22 1215    Specimen: Blood Updated: 11/18/22 1301     Scan Slide --     Comment: See Manual Differential Results       CBC Auto Differential [[id number]]  (Abnormal) Collected: 11/18/22 1215    Specimen: Blood Updated: 11/18/22 1301     WBC 21.20 10*3/mm3      RBC 4.19 10*6/mm3      Hemoglobin 11.4 g/dL      Comment: Result checked          Hematocrit 35.2 %      MCV 84.0 fL      MCH 27.1 pg      MCHC 32.3 g/dL      RDW 12.6 %      RDW-SD 37.2 fl      MPV 8.6 fL      Platelets 372 10*3/mm3     Comprehensive Metabolic Panel [[id number]]  (Abnormal) Collected: 11/18/22 1215    Specimen: Blood Updated: 11/18/22 1253     Glucose 101 mg/dL      BUN 5 mg/dL      Creatinine 0.51 mg/dL      Sodium 137 mmol/L      Potassium 3.8 mmol/L      Comment: Slight hemolysis detected by analyzer. Results may be affected.        Chloride 99 mmol/L      CO2 29.0 mmol/L      Calcium 8.2 mg/dL      Total Protein 6.0 g/dL      Albumin 2.90 g/dL      ALT (SGPT) 9 U/L      AST (SGOT) 14 U/L      Comment: Slight hemolysis detected by analyzer. Results may be affected.        Alkaline Phosphatase 98 U/L      Total Bilirubin 0.3 mg/dL      Globulin 3.1 gm/dL      A/G Ratio 0.9 g/dL      BUN/Creatinine Ratio 9.8     Anion Gap 9.0 mmol/L      eGFR 139.0 mL/min/1.73      Comment: National Kidney Foundation and American Society of Nephrology (ASN) Task Force recommended calculation based on the Chronic Kidney Disease Epidemiology Collaboration (CKD-EPI) equation refit without adjustment for race.       Narrative:      GFR Normal >60  Chronic Kidney Disease <60  Kidney Failure <15      C-reactive Protein [[id number]]  (Abnormal) Collected: 11/18/22 1215    Specimen: Blood Updated: 11/18/22 1253     C-Reactive Protein 23.67  mg/dL     Vancomycin, Trough [[id number]]  (Abnormal) Collected: 11/18/22 1215    Specimen: Blood Updated: 11/18/22 1253     Vancomycin Trough 4.60 mcg/mL     Narrative:      Therapeutic Ranges for Vancomycin    Vancomycin Random   5.0-40.0 mcg/mL  Vancomycin Trough   5.0-20.0 mcg/mL  Vancomycin Peak     20.0-40.0 mcg/mL    Phosphorus [[id number]]  (Normal) Collected: 11/18/22 1215    Specimen: Blood Updated: 11/18/22 1253     Phosphorus 2.8 mg/dL     Magnesium [[id number]]  (Normal) Collected: 11/18/22 1215    Specimen: Blood Updated: 11/18/22 1253     Magnesium 1.8 mg/dL     Sedimentation Rate [[id number]]  (Abnormal) Collected: 11/18/22 1215    Specimen: Blood Updated: 11/18/22 1239     Sed Rate 36 mm/hr     Blood Culture - Blood, Arm, Left [[id number]]  (Normal) Collected: 11/16/22 1016    Specimen: Blood from Arm, Left Updated: 11/18/22 1031     Blood Culture No growth at 2 days    Blood Culture - Blood, Arm, Left [[id number]]  (Normal) Collected: 11/16/22 0946    Specimen: Blood from Arm, Left Updated: 11/18/22 1001     Blood Culture No growth at 2 days        Imaging Results (Last 24 Hours)     ** No results found for the last 24 hours. **            Medication Review:   Scheduled Meds:cefTRIAXone, 2 g, Intravenous, Q24H  polyethylene glycol, 17 g, Oral, Daily  sodium chloride, 10 mL, Intravenous, Q12H  vancomycin, 1,250 mg, Intravenous, Q8H      Continuous Infusions:Pharmacy to dose vancomycin,       PRN Meds:.•  acetaminophen  •  HYDROmorphone  •  [DISCONTINUED] Morphine **AND** naloxone  •  nitroglycerin  •  ondansetron **OR** ondansetron  •  oxyCODONE  •  Pharmacy to dose vancomycin  •  sodium chloride  •  [COMPLETED] Insert Peripheral IV **AND** sodium chloride  •  sodium chloride      Assessment & Plan     Improving with right arm abscess    Plan for disposition: If cultures negative tomorrow, would discharge home on doxycycline 100 mg twice daily for 2 weeks.  Continue daily wound packing    Jeremy Galindo,  MD  11/19/22  08:06 EST

## 2022-11-19 NOTE — PROGRESS NOTES
"Pharmacy Antimicrobial Dosing Service    Subjective:  Zach Blandon is a 32 y.o.male admitted with abscess of the arm. Pharmacy has been consulted to dose Vancomycin for possible sepsis/SSTI.    PMH: recovering IV drug user, but relapsed 3 days PTA.     S/p I&D of right upper forearm abscess 11/17      Assessment/Plan    1. Day #4 Vancomycin: Goal -600 mcg*h/mL.   Current regimen: vancomycin 1250mg (19mg/kg ABW) IV q8h.   11/19 @1054 vanc TL 15.9 mcg/mL   Per bayesian dosing software, estimated AUC is 508 and estimated trough level is 12.5 mcg/mL on current dose (at goal).    Will continue with current vancomycin dose and plan for repeat trough level on 11/22 @1100    2. Day #3 Ceftriaxone: 2g IV q24h. (Stop date 11/21 on current order)     Will continue to monitor drug levels, renal function, culture and sensitivities, and patient clinical status.       Objective:  Relevant clinical data and objective history reviewed:  177.8 cm (70\")   65.9 kg (145 lb 4.5 oz)   Ideal body weight: 73 kg (160 lb 15 oz)  Body mass index is 20.85 kg/m².    Results from last 7 days   Lab Units 11/19/22  1054 11/18/22  1215   VANCOMYCIN TR mcg/mL 15.90 4.60*     Results from last 7 days   Lab Units 11/19/22  0204 11/18/22  1215 11/17/22  0358   CREATININE mg/dL 0.50* 0.51* 0.60*     Estimated Creatinine Clearance: 199.5 mL/min (A) (by C-G formula based on SCr of 0.5 mg/dL (L)).  I/O last 3 completed shifts:  In: 1230 [P.O.:980; IV Piggyback:250]  Out: 3825 [Urine:3825]    Results from last 7 days   Lab Units 11/18/22  1215 11/17/22  0358 11/16/22  1016   WBC 10*3/mm3 21.20* 22.30* 27.40*     Temperature    11/18/22 2326 11/19/22 0413 11/19/22 0831   Temp: 98.4 °F (36.9 °C) 99 °F (37.2 °C) 98.8 °F (37.1 °C)     Baseline culture/source/susceptibility:  Microbiology Results (last 10 days)       Procedure Component Value - Date/Time    Wound Culture - Wound, Forearm, Right [[id number]]  (Abnormal) Collected: 11/17/22 0739    Lab " Status: Preliminary result Specimen: Wound from Forearm, Right Updated: 11/19/22 1025     Wound Culture Rare Gram Positive Cocci      Light growth (2+) Gram Positive Cocci     Gram Stain Many (4+) Mixed bacterial morphotypes seen on Gram Stain      Moderate (3+) WBCs per low power field      Few (2+) Epithelial cells per low power field    Blood Culture - Blood, Arm, Left [[id number]]  (Normal) Collected: 11/16/22 1016    Lab Status: Preliminary result Specimen: Blood from Arm, Left Updated: 11/19/22 1031     Blood Culture No growth at 3 days    Blood Culture - Blood, Arm, Left [[id number]]  (Normal) Collected: 11/16/22 0946    Lab Status: Preliminary result Specimen: Blood from Arm, Left Updated: 11/19/22 1001     Blood Culture No growth at 3 days            Anti-Infectives (From admission, onward)      Ordered     Dose/Rate Route Frequency Start Stop    11/18/22 1501  Vancomycin HCl 1,250 mg in sodium chloride 0.9 % 250 mL IVPB        Ordering Provider: Soren Garcia MD    1,250 mg Intravenous Every 8 Hours 11/18/22 2000 11/23/22 1959    11/17/22 0803  cefTRIAXone (ROCEPHIN) 2 g in sodium chloride 0.9 % 100 mL IVPB        Ordering Provider: Jeremy Galindo MD    2 g  200 mL/hr over 30 Minutes Intravenous Every 24 Hours 11/17/22 1030 11/22/22 1029    11/16/22 1523  Pharmacy to dose vancomycin        Ordering Provider: Jeremy Galindo MD     Does not apply Continuous PRN 11/16/22 1523 11/23/22 1522    11/16/22 0925  Vancomycin HCl 1,250 mg in sodium chloride 0.9 % 250 mL IVPB        Ordering Provider: Ashley Milian, APRN    20 mg/kg × 65.9 kg Intravenous Once 11/16/22 1030 11/16/22 1111    11/16/22 0925  piperacillin-tazobactam (ZOSYN) IVPB 3.375 g in 100 mL NS (CD)        Ordering Provider: Ashley Milian APRN    3.375 g  over 30 Minutes Intravenous Once 11/16/22 0927 11/16/22 1111            Tamika Reagan, PharmD, BCPS  11/19/22 12:40 EST

## 2022-11-19 NOTE — PLAN OF CARE
Goal Outcome Evaluation:              Outcome Evaluation: Pt had pain meds today, Dressing change done. IV abt given.

## 2022-11-19 NOTE — PROGRESS NOTES
[facility] Medicine Services Daily Progress Note    Patient Name: Zach Blandon  : 1990  MRN: [id number]  Primary Care Physician:  Provider, No Known  Date of admission: 2022      Subjective       Chief Complaint: Right upper extremity abscess        Patient Reports having some pain after surgery.  Swelling and pain improved however today.  1 more day of IV antibiotics and home on doxycycline tomorrow if stable.     ROS negative except as above       Objective      Vitals:   Temp:  [98.4 °F (36.9 °C)-99.8 °F (37.7 °C)] 98.8 °F (37.1 °C)  Heart Rate:  [78-83] 78  Resp:  [11-14] 14  BP: (125-135)/(60-76) 131/60    Physical Exam  Vitals reviewed.   Constitutional:       Comments: Mom at bedside   HENT:      Head: Normocephalic.   Cardiovascular:      Rate and Rhythm: Normal rate.   Pulmonary:      Effort: Pulmonary effort is normal.   Abdominal:      General: Abdomen is flat.      Palpations: Abdomen is soft.   Musculoskeletal:         General: Normal range of motion.      Cervical back: Normal range of motion.      Right lower leg: Edema present.      Comments: Severe swelling right forearm with postop wrapping   Skin:     General: Skin is warm.   Neurological:      General: No focal deficit present.      Mental Status: He is alert and oriented to person, place, and time.   Psychiatric:         Mood and Affect: Mood normal.         Behavior: Behavior normal.        Result Review    Result Review:  I have personally reviewed the results from the time of this admission to 2022 16:44 EST and agree with these findings:  [x]  Laboratory  []  Microbiology  []  Radiology  []  EKG/Telemetry   []  Cardiology/Vascular   []  Pathology  []  Old records  []  Other:  Most notable findings include: Lab work is normal except CRP elevated 16.6    Wounds (last 24 hours)     LDA Wound     Row Name 22 0831 22 2015          Wound 22 0733 Right proximal arm Incision    Wound -  Properties Group Placement Date: 11/17/22 -AM Placement Time: 0733  -AM Side: Right  -AM Orientation: proximal  -AM Location: arm  -AM Primary Wound Type: Incision  -AM    Dressing Appearance dry;intact  -EG --     Closure SOCORRO  -EG SOCORRO  -TS     Base dressing in place, unable to visualize  -EG dressing in place, unable to visualize  -TS     Drainage Characteristics/Odor -- serous;clots;bleeding controlled  Dressing changed, iodiform 1 inch packing removed, large amount blood noted from incision, repacked with iodiform 1 inch packing, covered with 4x4, kerlix and ace wrap.  -TS     Drainage Amount -- large  -TS     Retired Wound - Properties Group Placement Date: 11/17/22 -AM Placement Time: 0733  -AM Side: Right  -AM Orientation: proximal  -AM Location: arm  -AM Primary Wound Type: Incision  -AM    Retired Wound - Properties Group Date first assessed: 11/17/22 -AM Time first assessed: 0733  -AM Side: Right  -AM Location: arm  -AM Primary Wound Type: Incision  -AM          User Key  (r) = Recorded By, (t) = Taken By, (c) = Cosigned By    Initials Name Provider Type    EG Dena Bland LPN Licensed Nurse    Leslie Jaquez RN Registered Nurse    Carline Baez RN Registered Nurse                 Assessment & Plan       Brief Patient Summary:  Zach Blandon is a 32 y.o. male who presents with right forearm abscess status post I&D in the OR due to IV drug use        cefTRIAXone, 2 g, Intravenous, Q24H  polyethylene glycol, 17 g, Oral, Daily  sodium chloride, 10 mL, Intravenous, Q12H  vancomycin, 1,250 mg, Intravenous, Q12H        lactated ringers, 1,000 mL  Pharmacy to dose vancomycin,   sodium chloride, 125 mL/hr, Last Rate: 125 mL/hr (11/17/22 1001)           Active Hospital Problems:          Active Hospital Problems     Diagnosis     • **Abscess of arm, right        Plan:      Right arm abscess due to IV drug use  Status post I&D in the OR  Follow-up cultures from the OR  Continue pain control with  Dilaudid  Elevate arm  Continue IV antibiotics and aggressive pain control  Home tomorrow on p.o. Doxy if stable     Discontinue IV fluids     DVT prophylaxis:  Mechanical DVT prophylaxis orders are present.     CODE STATUS:    Level Of Support Discussed With: Patient  Code Status (Patient has no pulse and is not breathing): CPR (Attempt to Resuscitate)  Medical Interventions (Patient has pulse or is breathing): Full        Disposition:  I expect patient to be discharged nov 20.     This patient has been examined wearing appropriate Personal Protective Equipment and discussed with Patient and mom.     11/19/22      Electronically signed by Soren Garcia MD, 11/19/22, 16:44 EST.  [facility] Hospitalist Team

## 2022-11-20 VITALS
BODY MASS INDEX: 20.8 KG/M2 | HEART RATE: 66 BPM | SYSTOLIC BLOOD PRESSURE: 126 MMHG | DIASTOLIC BLOOD PRESSURE: 79 MMHG | RESPIRATION RATE: 13 BRPM | OXYGEN SATURATION: 96 % | WEIGHT: 145.28 LBS | TEMPERATURE: 98.3 F | HEIGHT: 70 IN

## 2022-11-20 LAB
BACTERIA SPEC AEROBE CULT: ABNORMAL
BACTERIA SPEC AEROBE CULT: ABNORMAL
GRAM STN SPEC: ABNORMAL

## 2022-11-20 PROCEDURE — 25010000002 CEFTRIAXONE PER 250 MG: Performed by: ORTHOPAEDIC SURGERY

## 2022-11-20 PROCEDURE — 25010000002 VANCOMYCIN HCL 1.25 G RECONSTITUTED SOLUTION 1 EACH VIAL: Performed by: INTERNAL MEDICINE

## 2022-11-20 PROCEDURE — 25010000002 HYDROMORPHONE 1 MG/ML SOLUTION: Performed by: ORTHOPAEDIC SURGERY

## 2022-11-20 RX ORDER — SULFAMETHOXAZOLE AND TRIMETHOPRIM 800; 160 MG/1; MG/1
1 TABLET ORAL 2 TIMES DAILY
Qty: 28 TABLET | Refills: 0 | Status: SHIPPED | OUTPATIENT
Start: 2022-11-20 | End: 2022-12-04

## 2022-11-20 RX ADMIN — HYDROMORPHONE HYDROCHLORIDE 1 MG: 1 INJECTION, SOLUTION INTRAMUSCULAR; INTRAVENOUS; SUBCUTANEOUS at 13:28

## 2022-11-20 RX ADMIN — HYDROMORPHONE HYDROCHLORIDE 1 MG: 1 INJECTION, SOLUTION INTRAMUSCULAR; INTRAVENOUS; SUBCUTANEOUS at 11:26

## 2022-11-20 RX ADMIN — VANCOMYCIN HYDROCHLORIDE 1250 MG: 1.25 INJECTION, POWDER, LYOPHILIZED, FOR SOLUTION INTRAVENOUS at 03:49

## 2022-11-20 RX ADMIN — HYDROMORPHONE HYDROCHLORIDE 1 MG: 1 INJECTION, SOLUTION INTRAMUSCULAR; INTRAVENOUS; SUBCUTANEOUS at 03:48

## 2022-11-20 RX ADMIN — Medication 10 ML: at 08:26

## 2022-11-20 RX ADMIN — HYDROMORPHONE HYDROCHLORIDE 1 MG: 1 INJECTION, SOLUTION INTRAMUSCULAR; INTRAVENOUS; SUBCUTANEOUS at 08:26

## 2022-11-20 RX ADMIN — HYDROMORPHONE HYDROCHLORIDE 1 MG: 1 INJECTION, SOLUTION INTRAMUSCULAR; INTRAVENOUS; SUBCUTANEOUS at 05:09

## 2022-11-20 RX ADMIN — CEFTRIAXONE 2 G: 2 INJECTION, POWDER, FOR SOLUTION INTRAMUSCULAR; INTRAVENOUS at 10:46

## 2022-11-20 RX ADMIN — OXYCODONE 10 MG: 5 TABLET ORAL at 01:13

## 2022-11-20 NOTE — PLAN OF CARE
Problem: Adult Inpatient Plan of Care  Goal: Plan of Care Review  Outcome: Ongoing, Progressing  Flowsheets (Taken 11/20/2022 0055)  Plan of Care Reviewed With: patient  Goal: Patient-Specific Goal (Individualized)  Outcome: Ongoing, Progressing  Goal: Absence of Hospital-Acquired Illness or Injury  Outcome: Ongoing, Progressing  Intervention: Identify and Manage Fall Risk  Recent Flowsheet Documentation  Taken 11/19/2022 2200 by Carline Samuels RN  Safety Promotion/Fall Prevention: safety round/check completed  Taken 11/19/2022 2030 by Carline Samuels RN  Safety Promotion/Fall Prevention:   safety round/check completed   room organization consistent   nonskid shoes/slippers when out of bed   muscle strengthening facilitated   mobility aid in reach   lighting adjusted   gait belt   fall prevention program maintained   elopement precautions   clutter free environment maintained   assistive device/personal items within reach  Intervention: Prevent and Manage VTE (Venous Thromboembolism) Risk  Recent Flowsheet Documentation  Taken 11/19/2022 2030 by Carline Samuels RN  Range of Motion: active ROM (range of motion) encouraged  Intervention: Prevent Infection  Recent Flowsheet Documentation  Taken 11/19/2022 2200 by Carline Samuels RN  Infection Prevention: rest/sleep promoted  Taken 11/19/2022 2030 by Carline Samuels RN  Infection Prevention: hand hygiene promoted  Goal: Optimal Comfort and Wellbeing  Outcome: Ongoing, Progressing  Intervention: Monitor Pain and Promote Comfort  Recent Flowsheet Documentation  Taken 11/19/2022 2030 by Carline Samuels RN  Pain Management Interventions: see MAR  Intervention: Provide Person-Centered Care  Recent Flowsheet Documentation  Taken 11/19/2022 2030 by Carline Samuels RN  Trust Relationship/Rapport:   choices provided   emotional support provided   empathic listening provided  Goal: Readiness for Transition of Care  Outcome: Ongoing, Progressing     Problem: Pain Acute  Goal:  Acceptable Pain Control and Functional Ability  Outcome: Ongoing, Progressing  Intervention: Prevent or Manage Pain  Recent Flowsheet Documentation  Taken 11/19/2022 2200 by Carline Samuels RN  Medication Review/Management: medications reviewed  Taken 11/19/2022 2030 by Carline Samuels RN  Bowel Elimination Promotion: adequate fluid intake promoted  Medication Review/Management: medications reviewed  Intervention: Develop Pain Management Plan  Recent Flowsheet Documentation  Taken 11/19/2022 2030 by Carline Samuels RN  Pain Management Interventions: see MAR  Intervention: Optimize Psychosocial Wellbeing  Recent Flowsheet Documentation  Taken 11/19/2022 2030 by Carline Samuels RN  Diversional Activities:   television   [employer] Towers     Problem: Skin or Soft Tissue Infection  Goal: Absence of Infection Signs and Symptoms  Outcome: Ongoing, Progressing  Intervention: Minimize and Manage Infection Progression  Recent Flowsheet Documentation  Taken 11/19/2022 2200 by Carline Samuels RN  Infection Prevention: rest/sleep promoted  Taken 11/19/2022 2030 by Carline Samuels RN  Infection Prevention: hand hygiene promoted   Goal Outcome Evaluation:  Plan of Care Reviewed With: patient

## 2022-11-20 NOTE — PROGRESS NOTES
LOS: 4 days   Patient Care Team:  Provider, No Known as PCP - General  Provider, No Known as PCP - Family Medicine        Subjective     Comfortable postop day 3 from incision and drainage right forearm        Objective     Vital Signs  Vitals:    11/19/22 1214 11/19/22 1923 11/20/22 0313 11/20/22 0840   BP: 140/50 141/86 122/69 126/79   BP Location: Left arm Left arm Left arm Left arm   Patient Position: Lying Sitting Lying Sitting   Pulse: 79 84 73 66   Resp: 16 14 15 13   Temp: 98.9 °F (37.2 °C) 99 °F (37.2 °C) 99 °F (37.2 °C) 98.3 °F (36.8 °C)   TempSrc: Oral Oral Oral Oral   SpO2: 100% 98% 98% 96%   Weight:       Height:           Physical Exam:   Alert and orient x3  Greatly decreased swelling and erythema.  Moving fingers well     Results Review:     I reviewed the patient's new clinical results.  I reviewed the patient's new imaging results    Lab Results (last 24 hours)     Procedure Component Value Units Date/Time    Blood Culture - Blood, Arm, Left [[id number]]  (Normal) Collected: 11/16/22 1016    Specimen: Blood from Arm, Left Updated: 11/20/22 1031     Blood Culture No growth at 4 days    Blood Culture - Blood, Arm, Left [[id number]]  (Normal) Collected: 11/16/22 0946    Specimen: Blood from Arm, Left Updated: 11/20/22 1001     Blood Culture No growth at 4 days    Wound Culture - Wound, Forearm, Right [[id number]]  (Abnormal)  (Susceptibility) Collected: 11/17/22 0739    Specimen: Wound from Forearm, Right Updated: 11/20/22 0958     Wound Culture Rare Staphylococcus aureus      Light growth (2+) Streptococcus Viridans Group     Gram Stain Many (4+) Mixed bacterial morphotypes seen on Gram Stain      Moderate (3+) WBCs per low power field      Few (2+) Epithelial cells per low power field    Susceptibility      Staphylococcus aureus      ALFRED      Clindamycin Resistant     Erythromycin Resistant     Inducible Clindamycin Resistance Positive      Oxacillin Susceptible      Rifampin Susceptible       Tetracycline Susceptible      Trimethoprim + Sulfamethoxazole Susceptible      Vancomycin Susceptible                       Susceptibility      Streptococcus Viridans Group      ALFRED      Ceftriaxone Susceptible      Penicillin G Susceptible      Vancomycin Susceptible                       Susceptibility Comments     Staphylococcus aureus    This isolate is presumed to be clindamycin resistant based on detection of inducible clindamycin resistance.  Clindamycin may still be effective in some patients.    Streptococcus Viridans Group    This isolate does not demonstrate inducible clindamycin resistance in vitro.                   Imaging Results (Last 24 Hours)     ** No results found for the last 24 hours. **            Medication Review:   Scheduled Meds:cefTRIAXone, 2 g, Intravenous, Q24H  polyethylene glycol, 17 g, Oral, Daily  sodium chloride, 10 mL, Intravenous, Q12H  vancomycin, 1,250 mg, Intravenous, Q8H      Continuous Infusions:Pharmacy to dose vancomycin,       PRN Meds:.•  acetaminophen  •  HYDROmorphone  •  [DISCONTINUED] Morphine **AND** naloxone  •  nitroglycerin  •  ondansetron **OR** ondansetron  •  oxyCODONE  •  Pharmacy to dose vancomycin  •  sodium chloride  •  [COMPLETED] Insert Peripheral IV **AND** sodium chloride  •  sodium chloride      Assessment & Plan     Right forearm abscess with staph and strep    Plan for disposition: Okay to discharge home with daily wound packing with 1 inch plain Nu Gauze and Bactrim DS every 12 hours for 14 days.  Return to see me in 2 weeks    Jeremy Galindo MD  11/20/22  12:11 EST

## 2022-11-20 NOTE — DISCHARGE SUMMARY
[facility] Medicine Services  DISCHARGE SUMMARY    Patient Name: Zach Blandon  : 1990  MRN: [id number]    Discharge condition: Stable right upper extremity abscess post IV drug use  Date of Admission: 2022  Discharge Diagnosis: Right upper extremity abscess post IV drug use  Date of Discharge:  22    Primary Care Physician: Provider, No Known      Presenting Problem:   Abscess of arm, right [L02.413]  IV drug abuse (HCC) [F19.10]  Pain of right upper extremity [M79.601]    Active and Resolved Hospital Problems:  Active Hospital Problems    Diagnosis POA   • **Abscess of arm, right [L02.413] Yes      Resolved Hospital Problems   No resolved problems to display.         Hospital Course     Hospital Course:  Zach Blandon is a 32 y.o. male who presented to the hospital with right upper extremity abscess swelling pain.  The patient was evaluated and found to have abscess at the injection site.  Patient was taken to the OR by orthopedics and abscess was drained and packed.  Patient was maintained on IV antibiotics including vancomycin and ceftriaxone.  The patient grew staph and strep and his culture mostly sensitive.  Once his pain improved he was switched over to p.o. Bactrim double strength twice a day and discharged home in stable condition with stable vitals with instructions not to inject any more drugs.          Reasons For Change In Medications and Indications for New Medications:      Day of Discharge     Vital Signs:  Temp:  [98.3 °F (36.8 °C)-99 °F (37.2 °C)] 98.3 °F (36.8 °C)  Heart Rate:  [66-84] 66  Resp:  [13-15] 13  BP: (122-141)/(69-86) 126/79    Physical Exam:  Physical Exam  Vitals reviewed.   Constitutional:       Comments: Mother at bedside   HENT:      Head: Normocephalic.   Cardiovascular:      Rate and Rhythm: Normal rate.   Pulmonary:      Effort: Pulmonary effort is normal.   Abdominal:      General: Abdomen is flat.      Palpations: Abdomen  is soft.   Musculoskeletal:         General: Swelling and tenderness present. Normal range of motion.      Cervical back: Normal range of motion.      Comments: Right forearm postop dressing   Skin:     General: Skin is warm.   Neurological:      General: No focal deficit present.      Mental Status: He is alert and oriented to person, place, and time.   Psychiatric:         Mood and Affect: Mood normal.         Behavior: Behavior normal.            Pertinent  and/or Most Recent Results     LAB RESULTS:      Lab 11/19/22  0204 11/18/22 1215 11/17/22  0358 11/16/22  1019 11/16/22  1016   WBC  --  21.20* 22.30*  --  27.40*   HEMOGLOBIN  --  11.4* 13.6  --  16.2   HEMATOCRIT  --  35.2* 41.5  --  48.6   PLATELETS  --  372 403  --  456*   NEUTROS ABS  --  18.23* 18.10*  --  24.30*   LYMPHS ABS  --   --  2.10  --  1.40   MONOS ABS  --   --  2.00*  --  1.60*   EOS ABS  --  0.21 0.00  --  0.00   MCV  --  84.0 84.1  --  83.9   SED RATE  --  36*  --   --   --    CRP 16.62* 23.67*  --   --   --    LACTATE  --   --   --  0.9  --          Lab 11/19/22  0204 11/18/22 1215 11/17/22  0358 11/16/22  1016   SODIUM 136 137 137 137   POTASSIUM 3.9 3.8 3.1* 3.9   CHLORIDE 98 99 97* 94*   CO2 28.0 29.0 25.0 28.0   ANION GAP 10.0 9.0 15.0 15.0   BUN 5* 5* 6 11   CREATININE 0.50* 0.51* 0.60* 0.79   EGFR 139.8 139.0 132.4 121.8   GLUCOSE 110* 101* 93 97   CALCIUM 8.2* 8.2* 8.7 10.0   MAGNESIUM 1.8 1.8  --   --    PHOSPHORUS 4.4 2.8  --   --          Lab 11/19/22  0204 11/18/22  1215 11/16/22  1016   TOTAL PROTEIN 5.6* 6.0 9.3*   ALBUMIN 3.00* 2.90* 4.40   GLOBULIN 2.6 3.1 4.9   ALT (SGPT) 9 9 24   AST (SGOT) 13 14 23   BILIRUBIN 0.3 0.3 0.7   ALK PHOS 102 98 142*                     Brief Urine Lab Results     None        Microbiology Results (last 10 days)     Procedure Component Value - Date/Time    Wound Culture - Wound, Forearm, Right [[id number]]  (Abnormal)  (Susceptibility) Collected: 11/17/22 0739    Lab Status: Final result  Specimen: Wound from Forearm, Right Updated: 11/20/22 0958     Wound Culture Rare Staphylococcus aureus      Light growth (2+) Streptococcus Viridans Group     Gram Stain Many (4+) Mixed bacterial morphotypes seen on Gram Stain      Moderate (3+) WBCs per low power field      Few (2+) Epithelial cells per low power field    Susceptibility      Staphylococcus aureus      ALFRED      Clindamycin Resistant     Erythromycin Resistant     Inducible Clindamycin Resistance Positive      Oxacillin Susceptible      Rifampin Susceptible      Tetracycline Susceptible      Trimethoprim + Sulfamethoxazole Susceptible      Vancomycin Susceptible                       Susceptibility      Streptococcus Viridans Group      ALFRED      Ceftriaxone Susceptible      Penicillin G Susceptible      Vancomycin Susceptible                       Susceptibility Comments     Staphylococcus aureus    This isolate is presumed to be clindamycin resistant based on detection of inducible clindamycin resistance.  Clindamycin may still be effective in some patients.    Streptococcus Viridans Group    This isolate does not demonstrate inducible clindamycin resistance in vitro.               Blood Culture - Blood, Arm, Left [[id number]]  (Normal) Collected: 11/16/22 1016    Lab Status: Preliminary result Specimen: Blood from Arm, Left Updated: 11/20/22 1031     Blood Culture No growth at 4 days    Blood Culture - Blood, Arm, Left [[id number]]  (Normal) Collected: 11/16/22 0946    Lab Status: Preliminary result Specimen: Blood from Arm, Left Updated: 11/20/22 1001     Blood Culture No growth at 4 days          CT Angiogram Upper Extremity Right With & Without Contrast    Result Date: 11/16/2022  Impression:  1. Abscess within the volar aspect of the musculature in the proximal forearm measuring 10.4 x 3.5 x 4.3 cm. 2. No definite arterial involvement.  Electronically Signed By-Jeremy England MD On:11/16/2022 12:35 PM This report was finalized on [id number] by   Jeremy England MD.      Results for orders placed during the hospital encounter of 11/16/22    Duplex Venous Upper Extremity - Right CAR    Interpretation Summary  •  The deep and superficial veins of the forearm were not imaged due to patient intolerance.  •  All other right sided vessels appear normal.      Results for orders placed during the hospital encounter of 11/16/22    Duplex Venous Upper Extremity - Right CAR    Interpretation Summary  •  The deep and superficial veins of the forearm were not imaged due to patient intolerance.  •  All other right sided vessels appear normal.          Labs Pending at Discharge:  Pending Labs     Order Current Status    CBC & Differential Collected (11/19/22 0201)    Blood Culture - Blood, Arm, Left Preliminary result    Blood Culture - Blood, Arm, Left Preliminary result          Procedures Performed  Procedure(s):  INCISION AND DRAINAGE UPPER EXTREMITY-FOREARM         Consults:   Consults     Date and Time Order Name Status Description    11/17/2022  9:49 AM Inpatient Consult to Hospitalist      11/16/2022  1:38 PM Hospitalist (on-call MD unless specified)      11/16/2022  1:17 PM Ortho (on-call MD unless specified) Completed             Discharge Details        Discharge Medications      New Medications      Instructions Start Date   sulfamethoxazole-trimethoprim 800-160 MG per tablet  Commonly known as: Bactrim DS   1 tablet, Oral, 2 Times Daily             No Known Allergies      Discharge Disposition:   Home or Self Care    Diet:  Hospital:  Diet Order   Procedures   • Diet Regular Texture (IDDSI 7); Regular Consistency; Regular/House Diet         Discharge Activity:         CODE STATUS:  Code Status and Medical Interventions:   Ordered at: 11/17/22 0949     Level Of Support Discussed With:    Patient     Code Status (Patient has no pulse and is not breathing):    CPR (Attempt to Resuscitate)     Medical Interventions (Patient has pulse or is breathing):    Full                  Time spent on Discharge including face to face service:  55 minutes    This patient has been examined wearing appropriate Personal Protective Equipment and discussed with Patient and mother. 11/20/22      Signature: Soren Garcia MD

## 2022-11-20 NOTE — PAYOR COMM NOTE
"  RE: [id number]    DC NOTIFICATION  DC DATE 11/20/22 - HOME WITH DAILY WOUND PACKING  =============================    UTILIZATION REVIEW  TEETEE CHOI RN   PH:   FAX: [phone]    [facility]  NPI# [id number]  TID # [id number]            Jean Claude Blandon (32 y.o. Male)     Date of Birth   1990    Social Security Number       Address   [address] [place] [address] [place] [address]    Home Phone   [phone]    MRN   [id number]       Scientology   None    Marital Status   Single                            Admission Date   11/16/22    Admission Type   Emergency    Admitting Provider   Xu Salmeron MD    Attending Provider       Department, Room/Bed   [facility], 206/1       Discharge Date   11/20/2022    Discharge Disposition   Home or Self Care    Discharge Destination                               Attending Provider: (none)   Allergies: No Known Allergies    Isolation: None   Infection: None   Code Status: CPR    Ht: 177.8 cm (70\")   Wt: 65.9 kg (145 lb 4.5 oz)    Admission Cmt: None   Principal Problem: Abscess of arm, right [L02.413]                 Active Insurance as of 11/16/2022     Primary Coverage     Payor Plan Insurance Group Employer/Plan Group    [facility] BY TA [facility] BY CELY [id number]     Payor Plan Address Payor Plan Phone Number Payor Plan Fax Number Effective Dates    [address] [address]   1/1/2021 - None Entered    [facility] [id number]       Subscriber Name Subscriber Birth Date Member ID       JEAN CLAUDE BLANDON 1990 [id number]                 Emergency Contacts      (Rel.) Home Phone Work Phone Mobile Phone    ROSIO OBREGON (Mother) [phone] -- [phone]            Discharge Summary    No notes of this type exist for this encounter.         "

## 2022-11-21 ENCOUNTER — READMISSION MANAGEMENT (OUTPATIENT)
Dept: CALL CENTER | Facility: HOSPITAL | Age: 32
End: 2022-11-21

## 2022-11-21 LAB
BACTERIA SPEC AEROBE CULT: NORMAL
BACTERIA SPEC AEROBE CULT: NORMAL

## 2022-11-21 NOTE — OUTREACH NOTE
Prep Survey    Flowsheet Row Responses   [facility] patient discharged from? [facility]   Is LACE score < 7 ? No   Emergency Room discharge w/ pulse ox? No   Eligibility Readm Mgmt   Discharge diagnosis right upper extremity abscess post IV drug use - INCISION AND DRAINAGE UPPER EXTREMITY-FOREARM, right   Does the patient have one of the following disease processes/diagnoses(primary or secondary)? Other   Does the patient have Home health ordered? No   Is there a DME ordered? No   Prep survey completed? Yes          TRIPP LINDQUIST - Registered Nurse

## 2022-11-21 NOTE — CASE MANAGEMENT/SOCIAL WORK
Case Management Discharge Note      Final Note: Home         Selected Continued Care - Discharged on 11/20/2022 Admission date: 11/16/2022 - Discharge disposition: Home or Self Care            Transportation Services  Private: Car    Final Discharge Disposition Code: 01 - home or self-care

## 2022-11-26 ENCOUNTER — READMISSION MANAGEMENT (OUTPATIENT)
Dept: CALL CENTER | Facility: HOSPITAL | Age: 32
End: 2022-11-26

## 2022-11-26 NOTE — OUTREACH NOTE
Medical Week 1 Survey    Flowsheet Row Responses   [facility] facility patient discharged from? [facility]   Does the patient have one of the following disease processes/diagnoses(primary or secondary)? Other   Week 1 attempt successful? No   Unsuccessful attempts Attempt 1          NILDA JONES - Registered Nurse

## 2022-11-29 ENCOUNTER — READMISSION MANAGEMENT (OUTPATIENT)
Dept: CALL CENTER | Facility: HOSPITAL | Age: 32
End: 2022-11-29

## 2022-11-29 NOTE — OUTREACH NOTE
Medical Week 1 Survey    Flowsheet Row Responses   [facility] facility patient discharged from? [facility]   Does the patient have one of the following disease processes/diagnoses(primary or secondary)? Other   Week 1 attempt successful? No   Unsuccessful attempts Attempt 2          CLEVE WATSON - Registered Nurse

## 2022-12-06 ENCOUNTER — READMISSION MANAGEMENT (OUTPATIENT)
Dept: CALL CENTER | Facility: HOSPITAL | Age: 32
End: 2022-12-06

## 2022-12-06 NOTE — OUTREACH NOTE
Medical Week 3 Survey    Flowsheet Row Responses   [facility] facility patient discharged from? [facility]   Does the patient have one of the following disease processes/diagnoses(primary or secondary)? Other   Week 3 attempt successful? No   Unsuccessful attempts Attempt 1          JOSÉ NASCIMENTO - Registered Nurse

## 2023-04-03 ENCOUNTER — HOSPITAL ENCOUNTER (OUTPATIENT)
Facility: HOSPITAL | Age: 33
Setting detail: OBSERVATION
Discharge: HOME | End: 2023-04-05
Attending: EMERGENCY MEDICINE | Admitting: INTERNAL MEDICINE
Payer: COMMERCIAL

## 2023-04-03 DIAGNOSIS — F19.10 IV DRUG ABUSE: ICD-10-CM

## 2023-04-03 DIAGNOSIS — N30.00 ACUTE CYSTITIS WITHOUT HEMATURIA: ICD-10-CM

## 2023-04-03 DIAGNOSIS — B17.10 ACUTE HEPATITIS C VIRUS INFECTION WITHOUT HEPATIC COMA: Primary | ICD-10-CM

## 2023-04-03 DIAGNOSIS — R11.2 INTRACTABLE VOMITING WITH NAUSEA: ICD-10-CM

## 2023-04-03 DIAGNOSIS — F11.93 HEROIN WITHDRAWAL: ICD-10-CM

## 2023-04-03 LAB
ALBUMIN SERPL-MCNC: 4.8 G/DL (ref 3.5–5.2)
ALBUMIN/GLOB SERPL: 1.4 G/DL
ALP SERPL-CCNC: 111 U/L (ref 39–117)
ALT SERPL W P-5'-P-CCNC: 113 U/L (ref 1–41)
AMPHET+METHAMPHET UR QL: NEGATIVE
ANION GAP SERPL CALCULATED.3IONS-SCNC: 19 MMOL/L (ref 5–15)
AST SERPL-CCNC: 67 U/L (ref 1–40)
BACTERIA UR QL AUTO: ABNORMAL /HPF
BARBITURATES UR QL SCN: NEGATIVE
BASOPHILS # BLD AUTO: 0 10*3/MM3 (ref 0–0.2)
BASOPHILS NFR BLD AUTO: 0.2 % (ref 0–1.5)
BENZODIAZ UR QL SCN: NEGATIVE
BILIRUB SERPL-MCNC: 1.1 MG/DL (ref 0–1.2)
BILIRUB UR QL STRIP: ABNORMAL
BUN SERPL-MCNC: 16 MG/DL (ref 6–20)
BUN/CREAT SERPL: 20.5 (ref 7–25)
CALCIUM SPEC-SCNC: 9.5 MG/DL (ref 8.6–10.5)
CANNABINOIDS SERPL QL: POSITIVE
CHLORIDE SERPL-SCNC: 94 MMOL/L (ref 98–107)
CK SERPL-CCNC: 104 U/L (ref 20–200)
CLARITY UR: CLEAR
CO2 SERPL-SCNC: 24 MMOL/L (ref 22–29)
COCAINE UR QL: NEGATIVE
COLOR UR: ABNORMAL
CREAT SERPL-MCNC: 0.78 MG/DL (ref 0.76–1.27)
D-LACTATE SERPL-SCNC: 1.1 MMOL/L (ref 0.5–2)
DEPRECATED RDW RBC AUTO: 41.1 FL (ref 37–54)
EGFRCR SERPLBLD CKD-EPI 2021: 122.3 ML/MIN/1.73
EOSINOPHIL # BLD AUTO: 0 10*3/MM3 (ref 0–0.4)
EOSINOPHIL NFR BLD AUTO: 0.2 % (ref 0.3–6.2)
ERYTHROCYTE [DISTWIDTH] IN BLOOD BY AUTOMATED COUNT: 14.3 % (ref 12.3–15.4)
ETHANOL UR QL: <0.01 %
GLOBULIN UR ELPH-MCNC: 3.4 GM/DL
GLUCOSE SERPL-MCNC: 122 MG/DL (ref 65–99)
GLUCOSE UR STRIP-MCNC: NEGATIVE MG/DL
HAV IGM SERPL QL IA: ABNORMAL
HBV CORE IGM SERPL QL IA: ABNORMAL
HBV SURFACE AG SERPL QL IA: ABNORMAL
HCT VFR BLD AUTO: 44.7 % (ref 37.5–51)
HCV AB SER DONR QL: REACTIVE
HGB BLD-MCNC: 14.7 G/DL (ref 13–17.7)
HGB UR QL STRIP.AUTO: NEGATIVE
HOLD SPECIMEN: NORMAL
HOLD SPECIMEN: NORMAL
HYALINE CASTS UR QL AUTO: ABNORMAL /LPF
KETONES UR QL STRIP: ABNORMAL
LEUKOCYTE ESTERASE UR QL STRIP.AUTO: ABNORMAL
LYMPHOCYTES # BLD AUTO: 0.9 10*3/MM3 (ref 0.7–3.1)
LYMPHOCYTES NFR BLD AUTO: 5.8 % (ref 19.6–45.3)
MAGNESIUM SERPL-MCNC: 1.8 MG/DL (ref 1.6–2.6)
MAGNESIUM SERPL-MCNC: 1.9 MG/DL (ref 1.6–2.6)
MCH RBC QN AUTO: 27.5 PG (ref 26.6–33)
MCHC RBC AUTO-ENTMCNC: 32.9 G/DL (ref 31.5–35.7)
MCV RBC AUTO: 83.4 FL (ref 79–97)
METHADONE UR QL SCN: NEGATIVE
MONOCYTES # BLD AUTO: 0.4 10*3/MM3 (ref 0.1–0.9)
MONOCYTES NFR BLD AUTO: 2.5 % (ref 5–12)
NEUTROPHILS NFR BLD AUTO: 14.9 10*3/MM3 (ref 1.7–7)
NEUTROPHILS NFR BLD AUTO: 91.3 % (ref 42.7–76)
NITRITE UR QL STRIP: POSITIVE
NRBC BLD AUTO-RTO: 0 /100 WBC (ref 0–0.2)
OPIATES UR QL: NEGATIVE
OXYCODONE UR QL SCN: NEGATIVE
PH UR STRIP.AUTO: 8.5 [PH] (ref 5–8)
PLATELET # BLD AUTO: 287 10*3/MM3 (ref 140–450)
PMV BLD AUTO: 9.7 FL (ref 6–12)
POTASSIUM SERPL-SCNC: 3.4 MMOL/L (ref 3.5–5.2)
PROT SERPL-MCNC: 8.2 G/DL (ref 6–8.5)
PROT UR QL STRIP: ABNORMAL
RBC # BLD AUTO: 5.36 10*6/MM3 (ref 4.14–5.8)
RBC # UR STRIP: ABNORMAL /HPF
REF LAB TEST METHOD: ABNORMAL
SODIUM SERPL-SCNC: 137 MMOL/L (ref 136–145)
SP GR UR STRIP: 1.04 (ref 1–1.03)
SQUAMOUS #/AREA URNS HPF: ABNORMAL /HPF
UROBILINOGEN UR QL STRIP: ABNORMAL
WBC # UR STRIP: ABNORMAL /HPF
WBC NRBC COR # BLD: 16.3 10*3/MM3 (ref 3.4–10.8)
WHOLE BLOOD HOLD COAG: NORMAL
WHOLE BLOOD HOLD SPECIMEN: NORMAL

## 2023-04-03 PROCEDURE — 25010000002 DICYCLOMINE PER 20 MG: Performed by: NURSE PRACTITIONER

## 2023-04-03 PROCEDURE — 25010000002 DIPHENHYDRAMINE PER 50 MG: Performed by: NURSE PRACTITIONER

## 2023-04-03 PROCEDURE — 80307 DRUG TEST PRSMV CHEM ANLYZR: CPT | Performed by: NURSE PRACTITIONER

## 2023-04-03 PROCEDURE — G0378 HOSPITAL OBSERVATION PER HR: HCPCS

## 2023-04-03 PROCEDURE — 85025 COMPLETE CBC W/AUTO DIFF WBC: CPT | Performed by: NURSE PRACTITIONER

## 2023-04-03 PROCEDURE — 25010000002 LORAZEPAM PER 2 MG: Performed by: NURSE PRACTITIONER

## 2023-04-03 PROCEDURE — 0 POTASSIUM CHLORIDE 10 MEQ/100ML SOLUTION: Performed by: NURSE PRACTITIONER

## 2023-04-03 PROCEDURE — 99285 EMERGENCY DEPT VISIT HI MDM: CPT

## 2023-04-03 PROCEDURE — 96372 THER/PROPH/DIAG INJ SC/IM: CPT

## 2023-04-03 PROCEDURE — 96367 TX/PROPH/DG ADDL SEQ IV INF: CPT

## 2023-04-03 PROCEDURE — 87086 URINE CULTURE/COLONY COUNT: CPT | Performed by: NURSE PRACTITIONER

## 2023-04-03 PROCEDURE — 83735 ASSAY OF MAGNESIUM: CPT

## 2023-04-03 PROCEDURE — 80053 COMPREHEN METABOLIC PANEL: CPT | Performed by: NURSE PRACTITIONER

## 2023-04-03 PROCEDURE — 81001 URINALYSIS AUTO W/SCOPE: CPT | Performed by: NURSE PRACTITIONER

## 2023-04-03 PROCEDURE — 25010000002 METOCLOPRAMIDE PER 10 MG: Performed by: NURSE PRACTITIONER

## 2023-04-03 PROCEDURE — 93005 ELECTROCARDIOGRAM TRACING: CPT | Performed by: EMERGENCY MEDICINE

## 2023-04-03 PROCEDURE — 25010000002 DROPERIDOL PER 5 MG: Performed by: NURSE PRACTITIONER

## 2023-04-03 PROCEDURE — 83605 ASSAY OF LACTIC ACID: CPT | Performed by: NURSE PRACTITIONER

## 2023-04-03 PROCEDURE — 96375 TX/PRO/DX INJ NEW DRUG ADDON: CPT

## 2023-04-03 PROCEDURE — 25010000002 CEFTRIAXONE PER 250 MG: Performed by: NURSE PRACTITIONER

## 2023-04-03 PROCEDURE — 82077 ASSAY SPEC XCP UR&BREATH IA: CPT | Performed by: NURSE PRACTITIONER

## 2023-04-03 PROCEDURE — 96365 THER/PROPH/DIAG IV INF INIT: CPT

## 2023-04-03 PROCEDURE — 82105 ALPHA-FETOPROTEIN SERUM: CPT | Performed by: NURSE PRACTITIONER

## 2023-04-03 PROCEDURE — 87040 BLOOD CULTURE FOR BACTERIA: CPT | Performed by: NURSE PRACTITIONER

## 2023-04-03 PROCEDURE — 82550 ASSAY OF CK (CPK): CPT | Performed by: NURSE PRACTITIONER

## 2023-04-03 PROCEDURE — 83735 ASSAY OF MAGNESIUM: CPT | Performed by: NURSE PRACTITIONER

## 2023-04-03 PROCEDURE — 80074 ACUTE HEPATITIS PANEL: CPT | Performed by: NURSE PRACTITIONER

## 2023-04-03 PROCEDURE — 25010000002 ONDANSETRON PER 1 MG

## 2023-04-03 RX ORDER — SODIUM CHLORIDE 9 MG/ML
40 INJECTION, SOLUTION INTRAVENOUS AS NEEDED
Status: DISCONTINUED | OUTPATIENT
Start: 2023-04-03 | End: 2023-04-05 | Stop reason: HOSPADM

## 2023-04-03 RX ORDER — POTASSIUM CHLORIDE 20 MEQ/1
40 TABLET, EXTENDED RELEASE ORAL AS NEEDED
Status: DISCONTINUED | OUTPATIENT
Start: 2023-04-03 | End: 2023-04-05 | Stop reason: HOSPADM

## 2023-04-03 RX ORDER — DIPHENHYDRAMINE HYDROCHLORIDE 50 MG/ML
50 INJECTION INTRAMUSCULAR; INTRAVENOUS ONCE
Status: COMPLETED | OUTPATIENT
Start: 2023-04-03 | End: 2023-04-03

## 2023-04-03 RX ORDER — LORAZEPAM 2 MG/ML
1 INJECTION INTRAMUSCULAR ONCE
Status: COMPLETED | OUTPATIENT
Start: 2023-04-03 | End: 2023-04-03

## 2023-04-03 RX ORDER — ONDANSETRON 4 MG/1
4 TABLET, FILM COATED ORAL EVERY 6 HOURS PRN
Status: DISCONTINUED | OUTPATIENT
Start: 2023-04-03 | End: 2023-04-05 | Stop reason: HOSPADM

## 2023-04-03 RX ORDER — ALUMINA, MAGNESIA, AND SIMETHICONE 2400; 2400; 240 MG/30ML; MG/30ML; MG/30ML
15 SUSPENSION ORAL EVERY 6 HOURS PRN
Status: DISCONTINUED | OUTPATIENT
Start: 2023-04-03 | End: 2023-04-05 | Stop reason: HOSPADM

## 2023-04-03 RX ORDER — POTASSIUM CHLORIDE 1.5 G/1.77G
40 POWDER, FOR SOLUTION ORAL AS NEEDED
Status: DISCONTINUED | OUTPATIENT
Start: 2023-04-03 | End: 2023-04-05 | Stop reason: HOSPADM

## 2023-04-03 RX ORDER — SODIUM CHLORIDE 0.9 % (FLUSH) 0.9 %
10 SYRINGE (ML) INJECTION AS NEEDED
Status: DISCONTINUED | OUTPATIENT
Start: 2023-04-03 | End: 2023-04-05 | Stop reason: HOSPADM

## 2023-04-03 RX ORDER — HYDROXYZINE HYDROCHLORIDE 25 MG/1
25 TABLET, FILM COATED ORAL 3 TIMES DAILY PRN
Status: DISCONTINUED | OUTPATIENT
Start: 2023-04-03 | End: 2023-04-05 | Stop reason: HOSPADM

## 2023-04-03 RX ORDER — DICYCLOMINE HYDROCHLORIDE 10 MG/ML
20 INJECTION INTRAMUSCULAR ONCE
Status: COMPLETED | OUTPATIENT
Start: 2023-04-03 | End: 2023-04-03

## 2023-04-03 RX ORDER — ONDANSETRON 2 MG/ML
4 INJECTION INTRAMUSCULAR; INTRAVENOUS EVERY 6 HOURS PRN
Status: DISCONTINUED | OUTPATIENT
Start: 2023-04-03 | End: 2023-04-05 | Stop reason: HOSPADM

## 2023-04-03 RX ORDER — SODIUM CHLORIDE 0.9 % (FLUSH) 0.9 %
10 SYRINGE (ML) INJECTION EVERY 12 HOURS SCHEDULED
Status: DISCONTINUED | OUTPATIENT
Start: 2023-04-03 | End: 2023-04-05 | Stop reason: HOSPADM

## 2023-04-03 RX ORDER — SODIUM CHLORIDE 9 MG/ML
75 INJECTION, SOLUTION INTRAVENOUS CONTINUOUS
Status: DISPENSED | OUTPATIENT
Start: 2023-04-03 | End: 2023-04-04

## 2023-04-03 RX ORDER — CHOLECALCIFEROL (VITAMIN D3) 125 MCG
5 CAPSULE ORAL NIGHTLY PRN
Status: DISCONTINUED | OUTPATIENT
Start: 2023-04-03 | End: 2023-04-05 | Stop reason: HOSPADM

## 2023-04-03 RX ORDER — POTASSIUM CHLORIDE 7.45 MG/ML
10 INJECTION INTRAVENOUS
Status: DISCONTINUED | OUTPATIENT
Start: 2023-04-03 | End: 2023-04-05 | Stop reason: HOSPADM

## 2023-04-03 RX ORDER — DROPERIDOL 2.5 MG/ML
1.25 INJECTION, SOLUTION INTRAMUSCULAR; INTRAVENOUS ONCE
Status: COMPLETED | OUTPATIENT
Start: 2023-04-03 | End: 2023-04-03

## 2023-04-03 RX ORDER — METOCLOPRAMIDE HYDROCHLORIDE 5 MG/ML
10 INJECTION INTRAMUSCULAR; INTRAVENOUS ONCE
Status: COMPLETED | OUTPATIENT
Start: 2023-04-03 | End: 2023-04-03

## 2023-04-03 RX ORDER — NICOTINE 21 MG/24HR
1 PATCH, TRANSDERMAL 24 HOURS TRANSDERMAL EVERY 24 HOURS
Status: DISCONTINUED | OUTPATIENT
Start: 2023-04-03 | End: 2023-04-05 | Stop reason: HOSPADM

## 2023-04-03 RX ADMIN — SODIUM CHLORIDE, POTASSIUM CHLORIDE, SODIUM LACTATE AND CALCIUM CHLORIDE 1000 ML: 600; 310; 30; 20 INJECTION, SOLUTION INTRAVENOUS at 16:37

## 2023-04-03 RX ADMIN — HYDROXYZINE HYDROCHLORIDE 25 MG: 25 TABLET, FILM COATED ORAL at 21:33

## 2023-04-03 RX ADMIN — METOCLOPRAMIDE 10 MG: 5 INJECTION, SOLUTION INTRAMUSCULAR; INTRAVENOUS at 13:04

## 2023-04-03 RX ADMIN — Medication 10 ML: at 21:05

## 2023-04-03 RX ADMIN — CEFTRIAXONE 1 G: 1 INJECTION, POWDER, FOR SOLUTION INTRAMUSCULAR; INTRAVENOUS at 19:45

## 2023-04-03 RX ADMIN — DROPERIDOL 1.25 MG: 2.5 INJECTION, SOLUTION INTRAMUSCULAR; INTRAVENOUS at 16:37

## 2023-04-03 RX ADMIN — Medication 5 MG: at 21:33

## 2023-04-03 RX ADMIN — SODIUM CHLORIDE, POTASSIUM CHLORIDE, SODIUM LACTATE AND CALCIUM CHLORIDE 2000 ML: 600; 310; 30; 20 INJECTION, SOLUTION INTRAVENOUS at 13:13

## 2023-04-03 RX ADMIN — DIPHENHYDRAMINE HYDROCHLORIDE 50 MG: 50 INJECTION, SOLUTION INTRAMUSCULAR; INTRAVENOUS at 13:04

## 2023-04-03 RX ADMIN — POTASSIUM CHLORIDE 10 MEQ: 7.46 INJECTION, SOLUTION INTRAVENOUS at 16:37

## 2023-04-03 RX ADMIN — DICYCLOMINE HYDROCHLORIDE 20 MG: 10 INJECTION, SOLUTION INTRAMUSCULAR at 13:07

## 2023-04-03 RX ADMIN — LORAZEPAM 1 MG: 2 INJECTION INTRAMUSCULAR; INTRAVENOUS at 13:03

## 2023-04-03 RX ADMIN — SODIUM CHLORIDE 75 ML/HR: 9 INJECTION, SOLUTION INTRAVENOUS at 20:59

## 2023-04-03 RX ADMIN — ONDANSETRON 4 MG: 2 INJECTION INTRAMUSCULAR; INTRAVENOUS at 21:34

## 2023-04-03 NOTE — Clinical Note
Level of Care: Telemetry [5]   Admitting Physician: TONNY LOVE [[id number]]   Attending Physician: TONNY LOVE [[id number]]

## 2023-04-03 NOTE — ED NOTES
Notified the floor that patient is a difficult stick and would have have cultures drawn one at a time since he only has one IV line. One set of cultures were drawn.  Next set will need to be drawn in 30 mins prior to IV antibiotics being given

## 2023-04-03 NOTE — H&P
[facility] Medicine Services  History & Physical    Patient Name: Zach Blandon  : 1990  MRN: [id number]  Primary Care Physician:  Provider, No Known  Date of admission: 4/3/2023  Date and Time of Service: 4/3/2023    Subjective      Chief Complaint: Nausea and vomiting    History of Present Illness: Zach Blandon is a 32 y.o. male who presented to [facility] on 4/3/2023 complaining of nausea and vomiting.  Patient states that he had not used heroin in a few months.  He states that 4 to 5 days ago he did heroin again.  Over the past 2 to 3 days he reports nausea, vomiting, diarrhea.  He complains of abdominal muscle pain but denies visceral abdominal pain. He denies hematemesis or hematochezia.  ED provider stated that when patient was asked about any prior history of hep C he stated that he was told his levels were elevated but never definitively diagnosed.  Patient then stated during this assessment that he knew he had hepatitis C that he was diagnosed with this a couple years ago but has not followed up or had treatment since.    In the ED, EKG shows ectopic bradycardia with heart rate of 59.  Vitals on admission, temp 97.8, pulse 68, respirations 18, /70, SPO2 99% on room air. All labs unremarkable except glucose 122, potassium 3.4, chloride 94, anion gap 19, , AST 67, WBC 16.3, hep C reactive.  UA reviewed showed plus for ketones, positive nitrates trace leukocytes. Patient received 1 g IV Rocephin, Bentyl, Benadryl, Inapsine, 3 L LR, Ativan, Reglan, potassium in ED. Hospitalist was contacted to admit patient for further care and management.     12 point ROS reviewed and negative except as mentioned above.      Personal History     No past medical history on file.    Past Surgical History:   Procedure Laterality Date   • INCISION AND DRAINAGE ARM Right 2022    Procedure: INCISION AND DRAINAGE UPPER EXTREMITY-FOREARM;  Surgeon: Jeremy Galindo MD;  Location:   [facility];  Service: Orthopedics;  Laterality: Right;       Family History: family history includes Diabetes insipidus in his father and mother. Otherwise pertinent FHx was reviewed and not pertinent to current issue.    Social History:  reports that he has been smoking cigarettes. He has a 16.00 pack-year smoking history. He has never used smokeless tobacco. He reports that he does not currently use drugs. He reports that he does not drink alcohol.    Home Medications:  Prior to Admission Medications     None            Allergies:  No Known Allergies    Objective      Vitals:   Temp:  [97.8 °F (36.6 °C)] 97.8 °F (36.6 °C)  Heart Rate:  [60-80] 68  Resp:  [15-22] 18  BP: (118-134)/(69-80) 130/70    Physical Exam  Constitutional:       Appearance: He is ill-appearing.   HENT:      Head: Normocephalic and atraumatic.      Mouth/Throat:      Mouth: Mucous membranes are dry.      Pharynx: Oropharynx is clear.   Eyes:      Extraocular Movements: Extraocular movements intact.      Conjunctiva/sclera: Conjunctivae normal.      Pupils: Pupils are equal, round, and reactive to light.   Cardiovascular:      Rate and Rhythm: Regular rhythm. Tachycardia present.      Pulses: Normal pulses.      Heart sounds: Normal heart sounds.   Pulmonary:      Effort: Pulmonary effort is normal.      Breath sounds: Normal breath sounds.   Abdominal:      General: Abdomen is flat. Bowel sounds are normal. There is no distension.      Palpations: Abdomen is soft. There is no mass.      Tenderness: There is no abdominal tenderness. There is no guarding or rebound.   Musculoskeletal:         General: Normal range of motion.      Cervical back: Normal range of motion and neck supple.   Skin:     General: Skin is warm and dry.      Capillary Refill: Capillary refill takes less than 2 seconds.   Neurological:      General: No focal deficit present.      Mental Status: He is alert and oriented to person, place, and time.          Result Review     Result Review:  I have personally reviewed the results from the time of this admission to 4/3/2023 19:48 EDT and agree with these findings:  [x]  Laboratory  [x]  Microbiology  [x]  Radiology  [x]  EKG/Telemetry   []  Cardiology/Vascular   []  Pathology  []  Old records  []  Other:  Most notable findings include:     In the ED, EKG shows ectopic bradycardia with heart rate of 59.  Vitals on admission, temp 97.8, pulse 68, respirations 18, /70, SPO2 99% on room air. All labs unremarkable except glucose 122, potassium 3.4, chloride 94, anion gap 19, , AST 67, WBC 16.3, hep C reactive.  UA reviewed showed plus for ketones, positive nitrates trace leukocytes. Patient received 1 g IV Rocephin, Bentyl, Benadryl, Inapsine, 3 L LR, Ativan, Reglan, potassium in ED. Hospitalist was contacted to admit patient for further care and management.         Assessment & Plan        Active Hospital Problems:  Active Hospital Problems    Diagnosis    • **Acute hepatitis C virus infection without hepatic coma      Plan:     Elevated Liver Enzymes  History of hepatitis C  -, AST 67 >> compared to ALT 9 and AST 13 on (11/19/2022)  -Hep C reactive  -PT/INR ordered  -GI consulted    Heroin abuse/withdrawal  -Last Heroin used 4 to 5 days ago  -Antiemetics ordered  -IV fluids ordered  -Neurochecks  -Cardiac monitoring  -Atarax as needed  -Encourage lifestyle modification and cessation of illicit drug use    Acute urinary tract infection  -UA showed plus for ketones, positive nitrates trace leukocytes.  -Rocephin given in ED, continue  -WBC 16.3  -Blood cultures ordered, results pending     Hypokalemia  -Potassium 3.4  -Potassium replacement protocol ordered        DVT prophylaxis: Mechanical SCDs  Mechanical DVT prophylaxis orders are present.    CODE STATUS:    Level Of Support Discussed With: Patient  Code Status (Patient has no pulse and is not breathing): CPR (Attempt to Resuscitate)  Medical Interventions (Patient  has pulse or is breathing): Full Support  Release to patient: Routine Release    Admission Status:  I believe this patient meets observation status.    I discussed the patient's findings and my recommendations with patient.    Signature: Electronically signed by XIOMARA Vogel, 04/03/23, 19:48 EDT.  [facility]ist Team

## 2023-04-03 NOTE — PROGRESS NOTES
Patient Story (Not Part of Legal Medical Record)  Nursing report ED to floor  Zach Blandon  32 y.o.  male    HPI:   Chief Complaint   Patient presents with    Vomiting       Admitting doctor:   Karen Haywood DO    Admitting diagnosis:   The primary encounter diagnosis was Acute hepatitis C virus infection without hepatic coma. Diagnoses of Intractable vomiting with nausea, Heroin withdrawal, IV drug abuse, and Acute cystitis without hematuria were also pertinent to this visit.    Code status:   Current Code Status       Date Active Code Status Order ID Comments User Context       4/3/2023 1840 CPR (Attempt to Resuscitate) [id number]  Racquel Gonzalez, APRN ED        Question Answer    Code Status (Patient has no pulse and is not breathing) CPR (Attempt to Resuscitate)    Medical Interventions (Patient has pulse or is breathing) Full Support    Level Of Support Discussed With Patient    Release to patient Routine Release                    Allergies:   Patient has no known allergies.    Isolation:  No active isolations     Fall Risk:  Fall Risk Assessment was completed, and patient is at moderate risk for falls.   Predictive Model Details         5 (Low) Factor Value    Calculated 4/3/2023 18:06 Musculoskeletal Assessment WDL    Risk of Fall Model Active Peripheral IV Present     Age 32     Respiratory Rate 18     Skin Assessment WDL     Number of Distinct Medication Classes administered 5     Magnesium 1.8 mg/dL     Drug Use No     Diastolic BP 70     Tobacco Use Current     Chloride 94 mmol/L     [place] Scale not on file     Total Bilirubin 1.1 mg/dL     Peripheral Vascular Assessment WDL      U/L     Financial Class Medicaid     Clinically Relevant Sex Not Female     Number of administrations of Ulcer Drugs 1     Gastrointestinal Assessment WDL     Creatinine 0.78 mg/dL     Cardiac Assessment WDL     Days after Admission 0.28     Calcium 9.5 mg/dL     Potassium 3.4 mmol/L     Albumin 4.8 g/dL          Weight:       04/03/23  1119   Weight: 72.6 kg (160 lb)       Intake and Output    Intake/Output Summary (Last 24 hours) at 4/3/2023 1847  Last data filed at 4/3/2023 1750  Gross per 24 hour   Intake 1100 ml   Output --   Net 1100 ml       Diet:   Dietary Orders (From admission, onward)       Start     Ordered    04/03/23 1836  Diet: Liquid Diets; Clear Liquid; Texture: Regular Texture (IDDSI 7); Fluid Consistency: Thin (IDDSI 0)  Diet Effective Now        References:    Diet Order [facility]   Question Answer Comment   Diets: Liquid Diets    Liquid Diet: Clear Liquid    Texture: Regular Texture (IDDSI 7)    Fluid Consistency: Thin (IDDSI 0)        04/03/23 1840                     Most recent vitals:   Vitals:    04/03/23 1415 04/03/23 1512 04/03/23 1630 04/03/23 1749   BP: 130/70 130/74 134/72 130/70   BP Location:       Patient Position:       Pulse: 74 73 75 68   Resp: 17 17 18 18   Temp:       TempSrc:       SpO2: 97% 97% 98% 99%   Weight:       Height:           Active LDAs/IV Access:   Lines, Drains & Airways       Active LDAs       Name Placement date Placement time Site Days    Peripheral IV 04/03/23 1500 Anterior;Right;Upper Arm 04/03/23  1500  Arm  less than 1                    Skin Condition:   Skin Assessments (last day)       None             Labs (abnormal labs have a star):   Labs Reviewed   COMPREHENSIVE METABOLIC PANEL - Abnormal; Notable for the following components:       Result Value    Glucose 122 (*)     Potassium 3.4 (*)     Chloride 94 (*)     ALT (SGPT) 113 (*)     AST (SGOT) 67 (*)     Anion Gap 19.0 (*)     All other components within normal limits    Narrative:     GFR Normal >60  Chronic Kidney Disease <60  Kidney Failure <15     URINALYSIS W/ MICROSCOPIC IF INDICATED (NO CULTURE) - Abnormal; Notable for the following components:    Color, UA Dark Yellow (*)     pH, UA 8.5 (*)     Specific Gravity, UA 1.039 (*)     Ketones, UA >=160 mg/dL (4+) (*)     Bilirubin, UA Small (1+) (*)      Protein,  mg/dL (2+) (*)     Leuk Esterase, UA Trace (*)     Nitrite, UA Positive (*)     Urobilinogen, UA 2.0 E.U./dL (*)     All other components within normal limits   URINE DRUG SCREEN - Abnormal; Notable for the following components:    THC, Screen, Urine Positive (*)     All other components within normal limits    Narrative:     Negative Thresholds Per Drugs Screened:    Amphetamines                 500 ng/ml  Barbiturates                 200 ng/ml  Benzodiazepines              100 ng/ml  Cocaine                      300 ng/ml  Methadone                    300 ng/ml  Opiates                      300 ng/ml  Oxycodone                    100 ng/ml  THC                           50 ng/ml    The Normal Value for all drugs tested is negative. This report includes final unconfirmed screening results to be used for medical treatment purposes only. Unconfirmed results must not be used for non-medical purposes such as employment or legal testing. Clinical consideration should be applied to any drug of abuse test, particularly when unconfirmed results are used.          All urine drugs of abuse requests without chain of custody are for medical screening purposes only.  False positives are possible.     CBC WITH AUTO DIFFERENTIAL - Abnormal; Notable for the following components:    WBC 16.30 (*)     Neutrophil % 91.3 (*)     Lymphocyte % 5.8 (*)     Monocyte % 2.5 (*)     Eosinophil % 0.2 (*)     Neutrophils, Absolute 14.90 (*)     All other components within normal limits   HEPATITIS PANEL, ACUTE - Abnormal; Notable for the following components:    Hepatitis C Ab Reactive (*)     All other components within normal limits    Narrative:     Results may be falsely decreased if patient taking Biotin.    URINALYSIS, MICROSCOPIC ONLY - Abnormal; Notable for the following components:    WBC, UA 0-2 (*)     All other components within normal limits   CK - Normal   MAGNESIUM - Normal   BLOOD CULTURE   BLOOD CULTURE    URINE CULTURE   ETHANOL    Narrative:     Plasma Ethanol Clinical Symptoms:    ETOH (%)               Clinical Symptom  .01-.05              No apparent influence  .03-.12              Euphoria, Diminished judgment and attention   .09-.25              Impaired comprehension, Muscle incoordination  .18-.30              Confusion, Staggered gait, Slurred speech  .25-.40              Markedly decreased response to stimuli, unable to stand or                        walk, vomitting, sleep or stupor  .35-.50              Comatose, Anesthesia, Subnormal body temperature       RAINBOW DRAW    Narrative:     The following orders were created for panel order [place] Draw.  Procedure                               Abnormality         Status                     ---------                               -----------         ------                     Green Top (Gel)[[id number]]                                  Final result               Lavender Top[[id number]]                                     Final result               Gold Top - SST[[id number]]                                   Final result               Light Blue Top[[id number]]                                                                Please view results for these tests on the individual orders.   LACTIC ACID, PLASMA   PROTIME-INR   MAGNESIUM   GREEN TOP   LAVENDER TOP   GOLD TOP - SST   CBC AND DIFFERENTIAL    Narrative:     The following orders were created for panel order CBC & Differential.  Procedure                               Abnormality         Status                     ---------                               -----------         ------                     CBC Auto Differential[[id number]]        Abnormal            Final result                 Please view results for these tests on the individual orders.   LIGHT BLUE TOP       LOC: Person, Place, Time, and Situation    Telemetry:  Telemetry    Cardiac Monitoring Ordered: no    EKG:   ECG 12 Lead Rhythm Change    Preliminary Result   HEART RATE= 59  bpm   RR Interval= 1016  ms   OR Interval= 173  ms   P Horizontal Axis= 50  deg   P Front Axis= -47  deg   QRSD Interval= 105  ms   QT Interval= 492  ms   QRS Axis= 75  deg   T Wave Axis= 25  deg   - BORDERLINE ECG -   Ectopic atrial bradycardia   ST elev, probable normal early repol pattern   When compared with ECG of 16-Nov-2022 15:11:29,   Significant change in rhythm: previously sinus   Electronically Signed By:    Date and Time of Study: 2023-04-03 12:20:47          Medications Given in the ED:   Medications   sodium chloride 0.9 % flush 10 mL (has no administration in time range)   potassium chloride (K-DUR,KLOR-CON) CR tablet 40 mEq ( Oral Not Given:  See Alt 4/3/23 1750)     Or   potassium chloride (KLOR-CON) packet 40 mEq ( Oral Not Given:  See Alt 4/3/23 1750)     Or   potassium chloride 10 mEq in 100 mL IVPB (0 mEq Intravenous Stopped 4/3/23 1750)   cefTRIAXone (ROCEPHIN) 1 g in sodium chloride 0.9 % 100 mL IVPB (has no administration in time range)   sodium chloride 0.9 % flush 10 mL (has no administration in time range)   sodium chloride 0.9 % flush 10 mL (has no administration in time range)   sodium chloride 0.9 % infusion 40 mL (has no administration in time range)   aluminum-magnesium hydroxide-simethicone (MAALOX MAX) 400-400-40 MG/5ML suspension 15 mL (has no administration in time range)   ondansetron (ZOFRAN) tablet 4 mg (has no administration in time range)     Or   ondansetron (ZOFRAN) injection 4 mg (has no administration in time range)   melatonin tablet 5 mg (has no administration in time range)   nicotine (NICODERM CQ) 21 MG/24HR patch 1 patch (has no administration in time range)   sodium chloride 0.9 % infusion (has no administration in time range)   cefTRIAXone (ROCEPHIN) 1 g in sodium chloride 0.9 % 100 mL IVPB (has no administration in time range)   lactated ringers bolus 2,000 mL (0 mL Intravenous Stopped 4/3/23 1736)   LORazepam (ATIVAN)  injection 1 mg (1 mg Intravenous Given 4/3/23 1303)   metoclopramide (REGLAN) injection 10 mg (10 mg Intravenous Given 4/3/23 1304)   diphenhydrAMINE (BENADRYL) injection 50 mg (50 mg Intravenous Given 4/3/23 1304)   dicyclomine (BENTYL) injection 20 mg (20 mg Intramuscular Given 4/3/23 1307)   droperidol (INAPSINE) injection 1.25 mg (1.25 mg Intravenous Given 4/3/23 1637)   lactated ringers bolus 1,000 mL (0 mL Intravenous Stopped 4/3/23 1750)       Imaging results:  No radiology results for the last day    Social issues:   Social History     Socioeconomic History    Marital status: Single   Tobacco Use    Smoking status: Every Day     Packs/day: 1.00     Years: 16.00     Pack years: 16.00     Types: Cigarettes    Smokeless tobacco: Never   Vaping Use    Vaping Use: Some days    Substances: Nicotine    Devices: Disposable   Substance and Sexual Activity    Alcohol use: Never    Drug use: Not Currently    Sexual activity: Defer       NIH Stroke Scale:  Interval: (not recorded)  1a. Level of Consciousness: (not recorded)  1b. LOC Questions: (not recorded)  1c. LOC Commands: (not recorded)  2. Best Gaze: (not recorded)  3. Visual: (not recorded)  4. Facial Palsy: (not recorded)  5a. Motor Arm, Left: (not recorded)  5b. Motor Arm, Right: (not recorded)  6a. Motor Leg, Left: (not recorded)  6b. Motor Leg, Right: (not recorded)  7. Limb Ataxia: (not recorded)  8. Sensory: (not recorded)  9. Best Language: (not recorded)  10. Dysarthria: (not recorded)  11. Extinction and Inattention (formerly Neglect): (not recorded)    Total (NIH Stroke Scale): (not recorded)     Additional notable assessment information:     Nursing report ED to floor:  MIPS MARITA Ojeda LPN   04/03/23 18:47 EDT         Other (Not Part of Legal Medical Record)

## 2023-04-03 NOTE — ED PROVIDER NOTES
Subjective   History of Present Illness  32-year-old male presents with a 4-day history of nausea, bilious emesis, myalgias, and insomnia after last use of IV heroin.  He denies any visceral abdominal pain, only myalgia.  Denies any hematemesis.  Denies diarrhea melena hematochezia.  Reports that he is unable to keep down any p.o. fluids.    He denies having primary care established.        Review of Systems    No past medical history on file.    No Known Allergies    Past Surgical History:   Procedure Laterality Date   • INCISION AND DRAINAGE ARM Right 11/17/2022    Procedure: INCISION AND DRAINAGE UPPER EXTREMITY-FOREARM;  Surgeon: Jeremy Galindo MD;  Location: [facility] MAIN OR;  Service: Orthopedics;  Laterality: Right;       Family History   Problem Relation Age of Onset   • Diabetes insipidus Mother    • Diabetes insipidus Father        Social History     Socioeconomic History   • Marital status: Single   Tobacco Use   • Smoking status: Every Day     Packs/day: 1.00     Years: 16.00     Pack years: 16.00     Types: Cigarettes   • Smokeless tobacco: Never   Vaping Use   • Vaping Use: Some days   • Substances: Nicotine   • Devices: Disposable   Substance and Sexual Activity   • Alcohol use: Never   • Drug use: Not Currently   • Sexual activity: Defer           Objective   Physical Exam  Vitals and nursing note reviewed.   Constitutional:       General: He is awake. He is in acute distress.      Appearance: Normal appearance. He is well-developed and normal weight. He is ill-appearing and diaphoretic. He is not toxic-appearing.   HENT:      Head: Normocephalic and atraumatic. No raccoon eyes or Segura's sign.      Right Ear: External ear normal. No drainage.      Left Ear: External ear normal. No drainage.      Nose: Nose normal. No rhinorrhea.      Mouth/Throat:      Lips: Pink. No lesions.      Mouth: Mucous membranes are moist.      Pharynx: Oropharynx is clear. Uvula midline.   Eyes:      General: No scleral  icterus.  Cardiovascular:      Rate and Rhythm: Normal rate and regular rhythm.      Pulses: Normal pulses.           Radial pulses are 2+ on the right side and 2+ on the left side.        Dorsalis pedis pulses are 2+ on the right side and 2+ on the left side.        Posterior tibial pulses are 2+ on the right side and 2+ on the left side.      Heart sounds: Normal heart sounds, S1 normal and S2 normal. Heart sounds not distant. No murmur heard.    No friction rub. No gallop.   Pulmonary:      Effort: Pulmonary effort is normal.      Breath sounds: Normal breath sounds and air entry.   Abdominal:      General: Abdomen is flat. Bowel sounds are normal. There is no distension.      Palpations: Abdomen is soft. There is no mass.      Tenderness: There is no abdominal tenderness. There is no right CVA tenderness, left CVA tenderness, guarding or rebound.      Hernia: No hernia is present.   Skin:     General: Skin is warm.      Capillary Refill: Capillary refill takes less than 2 seconds.      Coloration: Skin is not jaundiced or pale.      Findings: No rash.   Neurological:      Mental Status: He is alert and oriented to person, place, and time.      GCS: GCS eye subscore is 4. GCS verbal subscore is 5. GCS motor subscore is 6.   Psychiatric:         Attention and Perception: Perception normal. He is inattentive.         Mood and Affect: Affect normal. Mood is anxious.         Speech: Speech normal.         Behavior: Behavior normal. Behavior is cooperative.         Thought Content: Thought content normal.         Judgment: Judgment normal.         Procedures     My EKG interpretation: Ectopic atrial bradycardia with normal early repolarization pattern rate of 59.  Compared to previous EKG from 11/16/2022 sinus rhythm rate of 88.  Reviewed by my attending, Dr. Archlueta.      ED Course  ED Course as of 04/03/23 1845   Mon Apr 03, 2023   1330 Delay in care due to patient is a difficult stick. [AL]      ED Course User  "Index  [AL] Marcelle Juan, APRN                                           Medical Decision Making  Acute cystitis without hematuria: acute illness or injury  Acute hepatitis C virus infection without hepatic coma: acute illness or injury  Heroin withdrawal: acute illness or injury  Intractable vomiting with nausea: acute illness or injury  IV drug abuse: acute illness or injury  Amount and/or Complexity of Data Reviewed  Labs: ordered.  ECG/medicine tests: ordered.      Risk  Prescription drug management.  Decision regarding hospitalization.        Interpreted by radiologist as below:     No radiology results for the last day      /70   Pulse 68   Temp 97.8 °F (36.6 °C) (Oral)   Resp 18   Ht 177.8 cm (70\")   Wt 72.6 kg (160 lb)   SpO2 99%   BMI 22.96 kg/m²      Lab Results (last 24 hours)     Procedure Component Value Units Date/Time    CBC & Differential [[id number]]  (Abnormal) Collected: 04/03/23 1223    Specimen: Blood Updated: 04/03/23 1431    Narrative:      The following orders were created for panel order CBC & Differential.  Procedure                               Abnormality         Status                     ---------                               -----------         ------                     CBC Auto Differential[[id number]]        Abnormal            Final result                 Please view results for these tests on the individual orders.    Comprehensive Metabolic Panel [[id number]]  (Abnormal) Collected: 04/03/23 1223    Specimen: Blood Updated: 04/03/23 1446     Glucose 122 mg/dL      BUN 16 mg/dL      Creatinine 0.78 mg/dL      Sodium 137 mmol/L      Potassium 3.4 mmol/L      Chloride 94 mmol/L      CO2 24.0 mmol/L      Calcium 9.5 mg/dL      Total Protein 8.2 g/dL      Albumin 4.8 g/dL      ALT (SGPT) 113 U/L      AST (SGOT) 67 U/L      Alkaline Phosphatase 111 U/L      Total Bilirubin 1.1 mg/dL      Globulin 3.4 gm/dL      A/G Ratio 1.4 g/dL      BUN/Creatinine Ratio 20.5     Anion " Gap 19.0 mmol/L      eGFR 122.3 mL/min/1.73     Narrative:      GFR Normal >60  Chronic Kidney Disease <60  Kidney Failure <15      CK [[id number]]  (Normal) Collected: 04/03/23 1223    Specimen: Blood Updated: 04/03/23 1446     Creatine Kinase 104 U/L     Magnesium [[id number]]  (Normal) Collected: 04/03/23 1223    Specimen: Blood Updated: 04/03/23 1446     Magnesium 1.8 mg/dL     Ethanol [[id number]] Collected: 04/03/23 1223    Specimen: Blood Updated: 04/03/23 1446     Ethanol % <0.010 %     Narrative:      Plasma Ethanol Clinical Symptoms:    ETOH (%)               Clinical Symptom  .01-.05              No apparent influence  .03-.12              Euphoria, Diminished judgment and attention   .09-.25              Impaired comprehension, Muscle incoordination  .18-.30              Confusion, Staggered gait, Slurred speech  .25-.40              Markedly decreased response to stimuli, unable to stand or                        walk, vomitting, sleep or stupor  .35-.50              Comatose, Anesthesia, Subnormal body temperature        CBC Auto Differential [[id number]]  (Abnormal) Collected: 04/03/23 1223    Specimen: Blood Updated: 04/03/23 1431     WBC 16.30 10*3/mm3      RBC 5.36 10*6/mm3      Hemoglobin 14.7 g/dL      Hematocrit 44.7 %      MCV 83.4 fL      MCH 27.5 pg      MCHC 32.9 g/dL      RDW 14.3 %      RDW-SD 41.1 fl      MPV 9.7 fL      Platelets 287 10*3/mm3      Neutrophil % 91.3 %      Lymphocyte % 5.8 %      Monocyte % 2.5 %      Eosinophil % 0.2 %      Basophil % 0.2 %      Neutrophils, Absolute 14.90 10*3/mm3      Lymphocytes, Absolute 0.90 10*3/mm3      Monocytes, Absolute 0.40 10*3/mm3      Eosinophils, Absolute 0.00 10*3/mm3      Basophils, Absolute 0.00 10*3/mm3      nRBC 0.0 /100 WBC     Hepatitis Panel, Acute [[id number]]  (Abnormal) Collected: 04/03/23 1223    Specimen: Blood Updated: 04/03/23 1635     Hepatitis B Surface Ag Non-Reactive     Hep A IgM Non-Reactive     Hep B C IgM Non-Reactive      Hepatitis C Ab Reactive    Narrative:      Results may be falsely decreased if patient taking Biotin.     Urinalysis With Microscopic If Indicated (No Culture) - Urine, Clean Catch [[id number]]  (Abnormal) Collected: 04/03/23 1627    Specimen: Urine, Clean Catch Updated: 04/03/23 1642     Color, UA Dark Yellow     Appearance, UA Clear     pH, UA 8.5     Specific Gravity, UA 1.039     Glucose, UA Negative     Ketones, UA >=160 mg/dL (4+)     Bilirubin, UA Small (1+)     Comment: Confirmation testing is unavailable.  A serum bilirubin is recommended for further assessment.        Blood, UA Negative     Protein,  mg/dL (2+)     Leuk Esterase, UA Trace     Nitrite, UA Positive     Urobilinogen, UA 2.0 E.U./dL    Urine Drug Screen - Urine, Clean Catch [[id number]]  (Abnormal) Collected: 04/03/23 1627    Specimen: Urine, Clean Catch Updated: 04/03/23 1703     Amphet/Methamphet, Screen Negative     Barbiturates Screen, Urine Negative     Benzodiazepine Screen, Urine Negative     Cocaine Screen, Urine Negative     Opiate Screen Negative     THC, Screen, Urine Positive     Methadone Screen, Urine Negative     Oxycodone Screen, Urine Negative    Narrative:      Negative Thresholds Per Drugs Screened:    Amphetamines                 500 ng/ml  Barbiturates                 200 ng/ml  Benzodiazepines              100 ng/ml  Cocaine                      300 ng/ml  Methadone                    300 ng/ml  Opiates                      300 ng/ml  Oxycodone                    100 ng/ml  THC                           50 ng/ml    The Normal Value for all drugs tested is negative. This report includes final unconfirmed screening results to be used for medical treatment purposes only. Unconfirmed results must not be used for non-medical purposes such as employment or legal testing. Clinical consideration should be applied to any drug of abuse test, particularly when unconfirmed results are used.          All urine drugs of abuse  requests without chain of custody are for medical screening purposes only.  False positives are possible.      Urinalysis, Microscopic Only - Urine, Clean Catch [[id number]]  (Abnormal) Collected: 04/03/23 1627    Specimen: Urine, Clean Catch Updated: 04/03/23 1658     RBC, UA None Seen /HPF      WBC, UA 0-2 /HPF      Bacteria, UA None Seen /HPF      Squamous Epithelial Cells, UA None Seen /HPF      Hyaline Casts, UA 0-2 /LPF      Methodology Manual Light Microscopy    Urine Culture - Urine, Urine, Clean Catch [[id number]] Collected: 04/03/23 1627    Specimen: Urine, Clean Catch Updated: 04/03/23 1722    Lactic Acid, Plasma [[id number]] Collected: 04/03/23 1836    Specimen: Blood Updated: 04/03/23 1842           Medications   sodium chloride 0.9 % flush 10 mL (has no administration in time range)   potassium chloride (K-DUR,KLOR-CON) CR tablet 40 mEq ( Oral Not Given:  See Alt 4/3/23 1750)     Or   potassium chloride (KLOR-CON) packet 40 mEq ( Oral Not Given:  See Alt 4/3/23 1750)     Or   potassium chloride 10 mEq in 100 mL IVPB (0 mEq Intravenous Stopped 4/3/23 1750)   cefTRIAXone (ROCEPHIN) 1 g in sodium chloride 0.9 % 100 mL IVPB (has no administration in time range)   sodium chloride 0.9 % flush 10 mL (has no administration in time range)   sodium chloride 0.9 % flush 10 mL (has no administration in time range)   sodium chloride 0.9 % infusion 40 mL (has no administration in time range)   aluminum-magnesium hydroxide-simethicone (MAALOX MAX) 400-400-40 MG/5ML suspension 15 mL (has no administration in time range)   ondansetron (ZOFRAN) tablet 4 mg (has no administration in time range)     Or   ondansetron (ZOFRAN) injection 4 mg (has no administration in time range)   melatonin tablet 5 mg (has no administration in time range)   nicotine (NICODERM CQ) 21 MG/24HR patch 1 patch (has no administration in time range)   sodium chloride 0.9 % infusion (has no administration in time range)   cefTRIAXone (ROCEPHIN) 1 g in  sodium chloride 0.9 % 100 mL IVPB (has no administration in time range)   lactated ringers bolus 2,000 mL (0 mL Intravenous Stopped 4/3/23 1736)   LORazepam (ATIVAN) injection 1 mg (1 mg Intravenous Given 4/3/23 1303)   metoclopramide (REGLAN) injection 10 mg (10 mg Intravenous Given 4/3/23 1304)   diphenhydrAMINE (BENADRYL) injection 50 mg (50 mg Intravenous Given 4/3/23 1304)   dicyclomine (BENTYL) injection 20 mg (20 mg Intramuscular Given 4/3/23 1307)   droperidol (INAPSINE) injection 1.25 mg (1.25 mg Intravenous Given 4/3/23 1637)   lactated ringers bolus 1,000 mL (0 mL Intravenous Stopped 4/3/23 1750)        Patient undressed and placed in gown for exam.  Appropriate PPE worn during patient exam.  Appropriate monitoring initiated. Patient is alert and oriented x3.  Acute distress noted.  Patient denies any visceral abdominal pain, soft, flat, nontender.  He does report some muscular tenderness.  IV established and labs obtained.  Patient was given lactated Ringer's 2 L bolus, Ativan 1 mg IV, Reglan 10 mg IV, and Benadryl 50 mg IV.White blood cell count 16,300 with left shift platelets 287 hemoglobin 14.7 hematocrit 44.7 potassium was noted to be 3.4 ALT and AST is elevated at 113 and 67 normal bilirubin, otherwise CMP unremarkable.  Hepatitis panel was significant for acute hepatitis C.  UDS was significant for THC.  Urine was significant for trace leuk esterase positive nitrites 4+ ketones small bilirubin.  Patient was given Rocephin 1 g IV.  He began dry heaving again, was given additional lactated Ringer's 1 L bolus, Inapsine 1.25 mg IV, and Bentyl 20 mg IM.  The hospitalist was paged for admission.  Spoke with RADHA Clement who agrees patient should be admitted for IV hydration, potassium replacement, and IV antibiotics.    I reviewed chart 11/17/2022 patient had I&D of right forearm secondary to abscess from IV drug abuse.  Thank you so much differential Diagnoses, not all-inclusive and does not constitute  entirety of all causes: Dehydration, withdrawal.  Patient was found to have both.    Disposition/Treatment: Discussed results with patient, verbalized understanding. Agreeable with plan of care. Patient was stable upon admission.    Upon reassessment, patient is flesh tone warm and dry no acute distress noted.  Vital signs are stable.    Part of this note may be an electronic transcription/translation of spoken language to printed text using the Dragon Dictation System.       Final diagnoses:   Acute hepatitis C virus infection without hepatic coma   Intractable vomiting with nausea   Heroin withdrawal   IV drug abuse   Acute cystitis without hematuria       ED Disposition  ED Disposition     ED Disposition   Decision to Admit    Condition   --    Comment   Level of Care: Telemetry [5]   Diagnosis: Acute hepatitis C virus infection without hepatic coma [[id number]]   Admitting Physician: TONNY LOVE [[id number]]   Attending Physician: TONNY LOVE [[id number]]               No follow-up provider specified.       Medication List      No changes were made to your prescriptions during this visit.          Marcelle Juan, APRN  04/08/23 0959

## 2023-04-04 ENCOUNTER — APPOINTMENT (OUTPATIENT)
Dept: ULTRASOUND IMAGING | Facility: HOSPITAL | Age: 33
End: 2023-04-04
Payer: COMMERCIAL

## 2023-04-04 ENCOUNTER — ON CAMPUS - OUTPATIENT (OUTPATIENT)
Dept: URBAN - METROPOLITAN AREA HOSPITAL 84 | Facility: HOSPITAL | Age: 33
End: 2023-04-04
Payer: COMMERCIAL

## 2023-04-04 ENCOUNTER — APPOINTMENT (OUTPATIENT)
Dept: GENERAL RADIOLOGY | Facility: HOSPITAL | Age: 33
End: 2023-04-04
Payer: COMMERCIAL

## 2023-04-04 DIAGNOSIS — R94.5 ABNORMAL RESULTS OF LIVER FUNCTION STUDIES: ICD-10-CM

## 2023-04-04 DIAGNOSIS — F12.188 CANNABIS ABUSE WITH OTHER CANNABIS-INDUCED DISORDER: ICD-10-CM

## 2023-04-04 DIAGNOSIS — B18.2 CHRONIC VIRAL HEPATITIS C: ICD-10-CM

## 2023-04-04 DIAGNOSIS — D72.829 ELEVATED WHITE BLOOD CELL COUNT, UNSPECIFIED: ICD-10-CM

## 2023-04-04 DIAGNOSIS — R11.2 NAUSEA WITH VOMITING, UNSPECIFIED: ICD-10-CM

## 2023-04-04 DIAGNOSIS — F11.20 OPIOID DEPENDENCE, UNCOMPLICATED: ICD-10-CM

## 2023-04-04 PROBLEM — F11.93 HEROIN WITHDRAWAL: Status: ACTIVE | Noted: 2023-04-03

## 2023-04-04 PROBLEM — R79.89 LOW TSH LEVEL: Status: ACTIVE | Noted: 2023-04-04

## 2023-04-04 PROBLEM — L02.413 ABSCESS OF ARM, RIGHT: Status: RESOLVED | Noted: 2022-11-16 | Resolved: 2023-04-04

## 2023-04-04 PROBLEM — N39.0 ACUTE UTI: Status: ACTIVE | Noted: 2023-04-04

## 2023-04-04 PROBLEM — R94.31 ABNORMAL EKG: Status: ACTIVE | Noted: 2023-04-04

## 2023-04-04 LAB
ALPHA-FETOPROTEIN: 3.18 NG/ML (ref 0–8.3)
ANION GAP SERPL CALCULATED.3IONS-SCNC: 15 MMOL/L (ref 5–15)
APAP SERPL-MCNC: <5 MCG/ML (ref 0–30)
BACTERIA SPEC AEROBE CULT: NO GROWTH
BASOPHILS # BLD AUTO: 0 10*3/MM3 (ref 0–0.2)
BASOPHILS NFR BLD AUTO: 0.3 % (ref 0–1.5)
BUN SERPL-MCNC: 14 MG/DL (ref 6–20)
BUN/CREAT SERPL: 20.9 (ref 7–25)
CALCIUM SPEC-SCNC: 8.8 MG/DL (ref 8.6–10.5)
CHLORIDE SERPL-SCNC: 97 MMOL/L (ref 98–107)
CO2 SERPL-SCNC: 23 MMOL/L (ref 22–29)
CREAT SERPL-MCNC: 0.67 MG/DL (ref 0.76–1.27)
DEPRECATED RDW RBC AUTO: 42.4 FL (ref 37–54)
EGFRCR SERPLBLD CKD-EPI 2021: 128 ML/MIN/1.73
EOSINOPHIL # BLD AUTO: 0 10*3/MM3 (ref 0–0.4)
EOSINOPHIL NFR BLD AUTO: 0 % (ref 0.3–6.2)
ERYTHROCYTE [DISTWIDTH] IN BLOOD BY AUTOMATED COUNT: 14.5 % (ref 12.3–15.4)
FERRITIN SERPL-MCNC: 201.3 NG/ML (ref 30–400)
GGT SERPL-CCNC: 38 U/L (ref 8–61)
GLUCOSE SERPL-MCNC: 80 MG/DL (ref 65–99)
HCT VFR BLD AUTO: 44.6 % (ref 37.5–51)
HGB BLD-MCNC: 14.3 G/DL (ref 13–17.7)
IRON 24H UR-MRATE: 78 MCG/DL (ref 59–158)
LYMPHOCYTES # BLD AUTO: 2.4 10*3/MM3 (ref 0.7–3.1)
LYMPHOCYTES NFR BLD AUTO: 18 % (ref 19.6–45.3)
MCH RBC QN AUTO: 27.2 PG (ref 26.6–33)
MCHC RBC AUTO-ENTMCNC: 32.1 G/DL (ref 31.5–35.7)
MCV RBC AUTO: 84.7 FL (ref 79–97)
MONOCYTES # BLD AUTO: 0.8 10*3/MM3 (ref 0.1–0.9)
MONOCYTES NFR BLD AUTO: 5.7 % (ref 5–12)
NEUTROPHILS NFR BLD AUTO: 10 10*3/MM3 (ref 1.7–7)
NEUTROPHILS NFR BLD AUTO: 76 % (ref 42.7–76)
NRBC BLD AUTO-RTO: 0 /100 WBC (ref 0–0.2)
PLATELET # BLD AUTO: 276 10*3/MM3 (ref 140–450)
PMV BLD AUTO: 9.8 FL (ref 6–12)
POTASSIUM SERPL-SCNC: 3.6 MMOL/L (ref 3.5–5.2)
QT INTERVAL: 492 MS
RBC # BLD AUTO: 5.26 10*6/MM3 (ref 4.14–5.8)
SODIUM SERPL-SCNC: 135 MMOL/L (ref 136–145)
T3FREE SERPL-MCNC: 2.01 PG/ML (ref 2–4.4)
T4 FREE SERPL-MCNC: 1.37 NG/DL (ref 0.93–1.7)
TSH SERPL DL<=0.05 MIU/L-ACNC: 0.14 UIU/ML (ref 0.27–4.2)
WBC NRBC COR # BLD: 13.2 10*3/MM3 (ref 3.4–10.8)

## 2023-04-04 PROCEDURE — 96375 TX/PRO/DX INJ NEW DRUG ADDON: CPT

## 2023-04-04 PROCEDURE — 93010 ELECTROCARDIOGRAM REPORT: CPT | Performed by: INTERNAL MEDICINE

## 2023-04-04 PROCEDURE — 82728 ASSAY OF FERRITIN: CPT | Performed by: NURSE PRACTITIONER

## 2023-04-04 PROCEDURE — 84439 ASSAY OF FREE THYROXINE: CPT | Performed by: NURSE PRACTITIONER

## 2023-04-04 PROCEDURE — 74018 RADEX ABDOMEN 1 VIEW: CPT

## 2023-04-04 PROCEDURE — 99255 IP/OBS CONSLTJ NEW/EST HI 80: CPT | Performed by: INTERNAL MEDICINE

## 2023-04-04 PROCEDURE — 36415 COLL VENOUS BLD VENIPUNCTURE: CPT

## 2023-04-04 PROCEDURE — 93005 ELECTROCARDIOGRAM TRACING: CPT | Performed by: INTERNAL MEDICINE

## 2023-04-04 PROCEDURE — 80048 BASIC METABOLIC PNL TOTAL CA: CPT

## 2023-04-04 PROCEDURE — 80143 DRUG ASSAY ACETAMINOPHEN: CPT | Performed by: NURSE PRACTITIONER

## 2023-04-04 PROCEDURE — 96366 THER/PROPH/DIAG IV INF ADDON: CPT

## 2023-04-04 PROCEDURE — 85025 COMPLETE CBC W/AUTO DIFF WBC: CPT

## 2023-04-04 PROCEDURE — 82977 ASSAY OF GGT: CPT | Performed by: NURSE PRACTITIONER

## 2023-04-04 PROCEDURE — G0378 HOSPITAL OBSERVATION PER HR: HCPCS

## 2023-04-04 PROCEDURE — 25010000002 METOCLOPRAMIDE PER 10 MG: Performed by: INTERNAL MEDICINE

## 2023-04-04 PROCEDURE — 83540 ASSAY OF IRON: CPT | Performed by: NURSE PRACTITIONER

## 2023-04-04 PROCEDURE — 96376 TX/PRO/DX INJ SAME DRUG ADON: CPT

## 2023-04-04 PROCEDURE — 25010000002 DIPHENHYDRAMINE PER 50 MG: Performed by: INTERNAL MEDICINE

## 2023-04-04 PROCEDURE — 25010000002 ONDANSETRON PER 1 MG

## 2023-04-04 PROCEDURE — 25010000002 CEFTRIAXONE PER 250 MG

## 2023-04-04 PROCEDURE — 84481 FREE ASSAY (FT-3): CPT | Performed by: NURSE PRACTITIONER

## 2023-04-04 PROCEDURE — 99222 1ST HOSP IP/OBS MODERATE 55: CPT | Performed by: NURSE PRACTITIONER

## 2023-04-04 PROCEDURE — 84443 ASSAY THYROID STIM HORMONE: CPT | Performed by: NURSE PRACTITIONER

## 2023-04-04 PROCEDURE — 76705 ECHO EXAM OF ABDOMEN: CPT

## 2023-04-04 PROCEDURE — 99204 OFFICE O/P NEW MOD 45 MIN: CPT | Performed by: NURSE PRACTITIONER

## 2023-04-04 RX ORDER — POLYETHYLENE GLYCOL 3350 17 G/17G
17 POWDER, FOR SOLUTION ORAL 2 TIMES DAILY
Status: DISCONTINUED | OUTPATIENT
Start: 2023-04-04 | End: 2023-04-05 | Stop reason: HOSPADM

## 2023-04-04 RX ORDER — PANTOPRAZOLE SODIUM 40 MG/10ML
40 INJECTION, POWDER, LYOPHILIZED, FOR SOLUTION INTRAVENOUS
Status: DISCONTINUED | OUTPATIENT
Start: 2023-04-04 | End: 2023-04-05 | Stop reason: HOSPADM

## 2023-04-04 RX ORDER — METOCLOPRAMIDE HYDROCHLORIDE 5 MG/ML
5 INJECTION INTRAMUSCULAR; INTRAVENOUS EVERY 6 HOURS
Status: DISCONTINUED | OUTPATIENT
Start: 2023-04-04 | End: 2023-04-05 | Stop reason: HOSPADM

## 2023-04-04 RX ORDER — NORTRIPTYLINE HYDROCHLORIDE 10 MG/1
10 CAPSULE ORAL NIGHTLY
Status: DISCONTINUED | OUTPATIENT
Start: 2023-04-04 | End: 2023-04-05 | Stop reason: HOSPADM

## 2023-04-04 RX ORDER — DIPHENHYDRAMINE HYDROCHLORIDE 50 MG/ML
25 INJECTION INTRAMUSCULAR; INTRAVENOUS EVERY 6 HOURS PRN
Status: DISCONTINUED | OUTPATIENT
Start: 2023-04-04 | End: 2023-04-05 | Stop reason: HOSPADM

## 2023-04-04 RX ADMIN — DIPHENHYDRAMINE HYDROCHLORIDE 25 MG: 50 INJECTION, SOLUTION INTRAMUSCULAR; INTRAVENOUS at 09:23

## 2023-04-04 RX ADMIN — NICOTINE 1 PATCH: 21 PATCH, EXTENDED RELEASE TRANSDERMAL at 17:09

## 2023-04-04 RX ADMIN — HYDROXYZINE HYDROCHLORIDE 25 MG: 25 TABLET, FILM COATED ORAL at 07:36

## 2023-04-04 RX ADMIN — PANTOPRAZOLE SODIUM 40 MG: 40 INJECTION, POWDER, LYOPHILIZED, FOR SOLUTION INTRAVENOUS at 17:09

## 2023-04-04 RX ADMIN — HYDROXYZINE HYDROCHLORIDE 25 MG: 25 TABLET, FILM COATED ORAL at 20:35

## 2023-04-04 RX ADMIN — Medication 5 MG: at 21:37

## 2023-04-04 RX ADMIN — ONDANSETRON 4 MG: 2 INJECTION INTRAMUSCULAR; INTRAVENOUS at 07:36

## 2023-04-04 RX ADMIN — ONDANSETRON 4 MG: 2 INJECTION INTRAMUSCULAR; INTRAVENOUS at 20:35

## 2023-04-04 RX ADMIN — Medication 10 ML: at 20:35

## 2023-04-04 RX ADMIN — POLYETHYLENE GLYCOL 3350 17 G: 17 POWDER, FOR SOLUTION ORAL at 13:00

## 2023-04-04 RX ADMIN — DIPHENHYDRAMINE HYDROCHLORIDE 25 MG: 50 INJECTION, SOLUTION INTRAMUSCULAR; INTRAVENOUS at 20:35

## 2023-04-04 RX ADMIN — METOCLOPRAMIDE 5 MG: 5 INJECTION, SOLUTION INTRAMUSCULAR; INTRAVENOUS at 17:09

## 2023-04-04 RX ADMIN — METOCLOPRAMIDE 5 MG: 5 INJECTION, SOLUTION INTRAMUSCULAR; INTRAVENOUS at 10:53

## 2023-04-04 RX ADMIN — CEFTRIAXONE 1 G: 1 INJECTION, POWDER, FOR SOLUTION INTRAMUSCULAR; INTRAVENOUS at 17:09

## 2023-04-04 RX ADMIN — NORTRIPTYLINE HYDROCHLORIDE 10 MG: 10 CAPSULE ORAL at 20:35

## 2023-04-04 RX ADMIN — Medication 10 ML: at 07:36

## 2023-04-04 NOTE — PROGRESS NOTES
[facility] Medicine Services   Daily Progress Note    Patient Name: Zach Blandon  : 1990  MRN: [id number]  Primary Care Physician:  Provider, No Known  Date of admission: 4/3/2023  Date and Time of Service: 23  at 14:07 EDT       Subjective      Chief Complaint: Intractable nausea and vomiting    Patient Reports continued significant nausea and vomiting.  Suspecting heroin withdrawal symptoms.  Started on Reglan and Benadryl IVP.    Review of Systems   Constitutional: Negative for chills, fever, malaise/fatigue, weight gain and weight loss.   HENT: Negative for hearing loss, nosebleeds and sore throat.    Eyes: Negative for blurred vision, pain and redness.   Cardiovascular: Negative for chest pain, leg swelling, palpitations and syncope.   Respiratory: Negative for cough, shortness of breath and sputum production.    Endocrine: Negative for polyuria.   Skin: Negative for color change, rash and suspicious lesions.   Musculoskeletal: Negative for back pain, joint pain and muscle weakness.   Gastrointestinal: Positive for nausea and vomiting. Negative for abdominal pain, anorexia, diarrhea, dysphagia, heartburn and melena.   Genitourinary: Negative for dysuria, frequency and urgency.   Neurological: Negative for dizziness, numbness and weakness.   Psychiatric/Behavioral: Negative for memory loss. The patient does not have insomnia and is not nervous/anxious.          Objective      Vitals:   Temp:  [98.2 °F (36.8 °C)-99.8 °F (37.7 °C)] 98.6 °F (37 °C)  Heart Rate:  [45-75] 64  Resp:  [10-21] 20  BP: (103-149)/(64-84) 149/84    Physical Exam  Vitals and nursing note reviewed.   Constitutional:       General: He is in acute distress.      Appearance: He is normal weight. He is ill-appearing.      Comments: Mild distress due to persistent nausea   HENT:      Head: Normocephalic and atraumatic.      Mouth/Throat:      Mouth: Mucous membranes are moist.      Pharynx: Oropharynx is clear.    Eyes:      Extraocular Movements: Extraocular movements intact.      Conjunctiva/sclera: Conjunctivae normal.      Pupils: Pupils are equal, round, and reactive to light.   Cardiovascular:      Rate and Rhythm: Normal rate and regular rhythm.      Pulses: Normal pulses.      Heart sounds: Normal heart sounds.   Pulmonary:      Effort: Pulmonary effort is normal. No respiratory distress.      Breath sounds: Normal breath sounds. No wheezing.   Abdominal:      General: Abdomen is flat. Bowel sounds are normal. There is no distension.      Palpations: Abdomen is soft.      Tenderness: There is abdominal tenderness. There is no guarding.      Comments: Mild general TTP   Musculoskeletal:         General: No swelling, tenderness or signs of injury.      Cervical back: Normal range of motion and neck supple.   Skin:     General: Skin is warm and dry.      Capillary Refill: Capillary refill takes less than 2 seconds.   Neurological:      General: No focal deficit present.      Mental Status: He is alert and oriented to person, place, and time. Mental status is at baseline.      Motor: No weakness.      Gait: Gait normal.   Psychiatric:         Mood and Affect: Mood normal.         Behavior: Behavior normal.         Judgment: Judgment normal.            Result Review    Result Review:  I have personally reviewed the results from the time of this admission to 4/4/2023 14:07 EDT and agree with these findings:  [x]  Laboratory  [x]  Microbiology  [x]  Radiology  []  EKG/Telemetry   []  Cardiology/Vascular   []  Pathology  []  Old records  []  Other:  Most notable findings include: Low TSH          Assessment & Plan      Brief Patient Summary:  Zach Blandon is a 32 y.o. male who came in yesterday for intractable nausea vomiting.  Less dehydrated, but still not able to tolerate any p.o. intake.  Given IVF fluids.      cefTRIAXone, 1 g, Intravenous, Q24H  metoclopramide, 5 mg, Intravenous, Q6H  nicotine, 1 patch, Transdermal,  Q24H  nortriptyline, 10 mg, Oral, Nightly  pantoprazole, 40 mg, Intravenous, BID AC  polyethylene glycol, 17 g, Oral, BID  sodium chloride, 10 mL, Intravenous, Q12H       sodium chloride, 75 mL/hr, Last Rate: 75 mL/hr (04/03/23 2059)         Active Hospital Problems:  Active Hospital Problems    Diagnosis    • **Acute hepatitis C virus infection without hepatic coma      Plan:   Elevated Liver Enzymes  History of hepatitis C  -, AST 67 >> compared to ALT 9 and AST 13 on (11/19/2022)  -Hep C reactive  -PT/INR ordered  -GI consulted    Secondary heart block, suspect Mobitz type II  - Cardiology consulted  -- EKG showing slight bradycardia with dropped P waves occasionally without prolonging MN interval    Heroin abuse/withdrawal  -Last Heroin used 4 to 5 days ago  -Antiemetics ordered  -IV fluids ordered  -Neurochecks  -Cardiac monitoring  -Atarax as needed  -Encourage lifestyle modification and cessation of illicit drug use  -Started on IV Reglan 5 mg, IV Benadryl 25 mg as needed for nausea    Acute urinary tract infection  -UA showed plus for ketones, positive nitrates trace leukocytes.  -Rocephin given in ED, continue  -WBC 16.3  -Blood cultures ordered, results pending      Hypokalemia  -Potassium 3.4  -Potassium replacement protocol ordered    DVT prophylaxis:  Mechanical DVT prophylaxis orders are present.    CODE STATUS:    Level Of Support Discussed With: Patient  Code Status (Patient has no pulse and is not breathing): CPR (Attempt to Resuscitate)  Medical Interventions (Patient has pulse or is breathing): Full Support  Release to patient: Routine Release      Disposition:  I expect patient to be discharged home in 3 to 4 days.    This patient has been examined wearing appropriate Personal Protective Equipment and discussed with Nurse. 04/04/23      Electronically signed by Karen Haywood DO, 04/04/23, 14:07 EDT.  [facility] Hospitalist Team

## 2023-04-04 NOTE — CONSULTS
"GI CONSULT  NOTE:    Referring Provider:  Dr. Haywood    Chief complaint: Nausea/vomiting    Subjective .     History of present illness: Patient is a 32-year-old male with history of IV drug use and chronic hepatitis C that was diagnosed years ago who presented to the hospital yesterday with complaints of nausea/vomiting.  His mother at the bedside who gives most of the history.  Years ago patient was diagnosed with chronic hepatitis C but his levels were \"very low\" and this was never treated.  He has history of IV drug use with heroin and was trying to quit on his own with last use about 1 week ago.  He also smokes marijuana.  Patient denies hematemesis.  Does have occasional heartburn at home.  Has vomited about 7 times this morning.  Does struggle with some constipation normally but denies any bright red blood per rectum or melena.  Complains of some chest discomfort from all of his vomiting.  He has never been diagnosed with any other liver disease aside from chronic hepatitis C.  No alcohol use.    Endo History:  No record of previous endoscopy.    Past Medical History:  History reviewed. No pertinent past medical history.    Past Surgical History:  Past Surgical History:   Procedure Laterality Date   • INCISION AND DRAINAGE ARM Right 11/17/2022    Procedure: INCISION AND DRAINAGE UPPER EXTREMITY-FOREARM;  Surgeon: Jeremy Galindo MD;  Location: [facility];  Service: Orthopedics;  Laterality: Right;       Social History:  Social History     Tobacco Use   • Smoking status: Every Day     Packs/day: 1.00     Years: 16.00     Pack years: 16.00     Types: Cigarettes   • Smokeless tobacco: Never   Vaping Use   • Vaping Use: Some days   • Substances: Nicotine   • Devices: Disposable   Substance Use Topics   • Alcohol use: Never   • Drug use: Yes     Types: Heroin     Comment: used 4 days ago       Family History:  Family History   Problem Relation Age of Onset   • Diabetes insipidus Mother    • Diabetes insipidus " "Father        Medications:  No medications prior to admission.       Scheduled Meds:cefTRIAXone, 1 g, Intravenous, Q24H  metoclopramide, 5 mg, Intravenous, Q6H  nicotine, 1 patch, Transdermal, Q24H  sodium chloride, 10 mL, Intravenous, Q12H      Continuous Infusions:sodium chloride, 75 mL/hr, Last Rate: 75 mL/hr (04/03/23 2059)      PRN Meds:.•  aluminum-magnesium hydroxide-simethicone  •  diphenhydrAMINE  •  hydrOXYzine  •  melatonin  •  ondansetron **OR** ondansetron  •  potassium chloride **OR** potassium chloride **OR** potassium chloride  •  [COMPLETED] Insert Peripheral IV **AND** sodium chloride  •  sodium chloride  •  sodium chloride    ALLERGIES:  Patient has no known allergies.    ROS:  Review of Systems   Constitutional: Negative for chills and fever.   Respiratory: Negative for cough and shortness of breath.    Cardiovascular: Positive for chest pain. Negative for palpitations.   Gastrointestinal: Positive for abdominal pain and constipation. Negative for blood in stool and nausea.   Neurological: Positive for weakness. Negative for dizziness.   Psychiatric/Behavioral: Negative for agitation and confusion.       Objective     Vital Signs:   Visit Vitals  /81 (BP Location: Right arm, Patient Position: Lying)   Pulse 61   Temp 98.2 °F (36.8 °C) (Oral)   Resp 21   Ht 177.8 cm (70\")   Wt 69.8 kg (153 lb 12.8 oz)   SpO2 93%   BMI 22.07 kg/m²       Physical Exam:      General Appearance:    Awake and alert, in no acute distress but appears uncomfortable   Head:    Normocephalic, without obvious abnormality, atraumatic   Eyes:            Conjunctivae normal, anicteric sclera   Ears:    Ears appear intact with no abnormalities noted   Throat:   No oral lesions, no thrush, oral mucosa moist   Neck:   No adenopathy, supple, no thyromegaly, no JVD   Lungs:     Respirations regular, even and unlabored       Chest Wall:    No abnormalities observed   Abdomen:     Soft, mild epigastric tenderness, no rebound or " guarding, non-distended, no hepatosplenomegaly   Rectal:     Deferred   Extremities:   Moves all extremities well, no edema, no cyanosis, no             redness   Pulses:   Pulses palpable and equal bilaterally   Skin:   No bleeding, bruising or rash, no jaundice, multiple tattoos   Lymph nodes:   No palpable adenopathy   Neurologic:   Sensation intact       Results Review:   I reviewed the patient's labs and imaging.  CBC  Results from last 7 days   Lab Units 04/04/23  1019 04/03/23  1223   RBC 10*6/mm3 5.26 5.36   WBC 10*3/mm3 13.20* 16.30*   HEMOGLOBIN g/dL 14.3 14.7   PLATELETS 10*3/mm3 276 287       CMP  Results from last 7 days   Lab Units 04/04/23  1019 04/03/23  1223   SODIUM mmol/L 135* 137   POTASSIUM mmol/L 3.6 3.4*   CHLORIDE mmol/L 97* 94*   CO2 mmol/L 23.0 24.0   BUN mg/dL 14 16   CREATININE mg/dL 0.67* 0.78   GLUCOSE mg/dL 80 122*   ALBUMIN g/dL  --  4.8   BILIRUBIN mg/dL  --  1.1   ALK PHOS U/L  --  111   AST (SGOT) U/L  --  67*   ALT (SGPT) U/L  --  113*       Amylase and Lipase        CRP         Imaging Results (Last 24 Hours)     ** No results found for the last 24 hours. **            ASSESSMENT AND PLAN:  32-year-old male presented to the hospital 4/3/2023 with nausea/vomiting.  Has history of recent IV drug use as well as chronic hepatitis C diagnosed years ago.  GI asked to consult due to elevated liver enzymes.    Elevated liver enzymes  History of chronic hepatitis C diagnosed years ago -treatment naïve  Nausea/vomiting  Leukocytosis  IV drug use  UTI    Principal Problem:    Acute hepatitis C virus infection without hepatic coma     Plan:  32-year-old male admitted to the hospital yesterday with nausea/vomiting.  He has history of IV heroin use and tried to quit on his own about 1 week ago.  Patient also uses marijuana.  Consider nausea/vomiting related to withdrawal symptoms or hyperemesis cannabis.  He does have some constipation at home.  We will check KUB today.  Start PPI twice daily  and continue Reglan.  Zofran as needed.  Clear liquid diet only.  Follow-up on HCVRNA as well as further liver serologies.  On Rocephin for urinary tract infection.    I discussed the patients findings and my recommendations with the patient.  Jeanna Whitfield, APRN  04/04/23  11:27 EDT

## 2023-04-04 NOTE — PLAN OF CARE
Problem: UTI (Urinary Tract Infection)  Goal: Improved Infection Symptoms  Outcome: Ongoing, Not Progressing     Problem: Fluid Volume Deficit  Goal: Fluid Balance  Outcome: Ongoing, Not Progressing   Goal Outcome Evaluation: no change    Alert and oriented x 4. Able to verbalize needs and wants. Takes medication whole and tolerates well. C/O nausea and insomnia, PRN medication administered with positive effect noted. Mother at bedside. Continent of bowel and bladder. Continues to receive IV Rocephin, no s/s of adverse/allergic reaction noted. Currently receiving NaCl at 75 ml/hr and tolerating well. No c/o pain/discomfort/SOB. Does not require O2 therapy at this time. Neuro checks performed and WNL. Compliant with clear/liquid diet.  Continues to be followed by Gastroenterology. Currently in bed, eyes closed/rise and fall of chest observed. Call bell in reach.

## 2023-04-04 NOTE — CASE MANAGEMENT/SOCIAL WORK
Social Work Assessment  [facility]     Patient Name: Zach Blandon  MRN: [id number]  Today's Date: 4/4/2023    Admit Date: 4/3/2023       Discharge Plan     Row Name 04/04/23 1711       Plan    Plan Comments LSW and CM met at bedside. Completed SA/ ETOH screen. Patient's mother reported patient relapsed 5 days ago on heroin. Patient US +for THC. Patient irritable and did not want to speak to LSW/ CM. LSW did direct his sobriety/ trigger for relapse question to patient. Patient does not want any rehab choices at this time and reported he will remain sober on his own. Reported does was nothing that triggered his relapse. Pt's mother reported he has been to rehab in the past and can remain sober from that, as well as support from family members that are counselors.                 Substance Abuse     Row Name 04/04/23 8572       Substance Use    Substance Use Status current street drug/inhalant/medication abuse;current tobacco use    Last Tobacco Use Date 04/03/23    Environment Typically Uses Tobacco alone;private residence    Attempts to Quit Tobacco Use none    Last Street Drug/Medication/Inhalant Use 04/03/23    Environment Typically Uses Street Drugs alone;private residence    Readiness to Change Street Use precontemplation    Attempts to Quit Street Drug Use quit on own    Previous Substance Use Treatment none    Substance Use Comment LSW and CM met with patient and patient's mother, at bedside. Patient very irritable and did not want to answer questions. Defaulted our questions to his mother to answer. Reported patient had been sober from Heroin for about 6 months and relapsed about 5 days ago. LSW did direct questions to patient about his sobriety. Patient reported he just quit using heroin on his own, cold turkey. Reported there was no trigger for him using again 5 days ago and does not want any SA treatment options. LSW discussed meeting with this patient on another floor and the importance of going to a  substance use treatment facility that can help with his sobriety. Patient's mother stated he had been to rehabs in the past and has family member's who are counselors that can be supports for him and assist him with his sobriety. Will continue to follow patient and provide support as needed.       AUDIT-C (Alcohol Use Disorders ID Test)    Q1: How often do you have a drink containing alcohol? Never    Q2: How many drinks containing alcohol do you have on a typical day when you are drinking? None    Q3: How often do you have six or more drinks on one occasion? Never    Audit-C Score 0                   Met with patient in room wearing PPE: mask.  Maintained distance greater than six feet and spent less than 15 minutes in the room.      MELVIN Garcia, EVGENYW    Phone: [phone]  Cell: [phone]  Fax: [phone]  [email or web address]

## 2023-04-04 NOTE — CONSULTS
Referring Provider: Karen Haywood DO    Reason for Consultation: Abnormal ECG      Patient Care Team:  Provider, No Known as PCP - General  Provider, No Known as PCP - Family Medicine      SUBJECTIVE     Chief Complaint: Nausea and vomiting    History of present illness:  Zach Blandon is a 32 y.o. male with IV heroin abuse who presents to the hospital with complaints of nausea and vomiting.  Work-up in the emergency room revealed acute urinary tract infection, acute hepatitis C infection.  Patient was started on antibiotics and given IV fluids.  Cardiology has been consulted due to findings of bradycardia on telemetry.  He also had an ECG which showed subtle ST elevation while he was at a heart rate of 48-50.  He denies any dizziness, lightheadedness, palpitations, near-syncope, chest pain or shortness of breath.  He continues to complain of nausea and vomiting.  He denies any other complaints at this time.    REVIEW OF SYSTEMS:    Constitutional: No weakness,fatigue, fever, rigors, chills   Eyes: No vision changes, eye pain   ENT/oropharynx: No difficulty swallowing, sore throat, epistaxis, changes in hearing   Cardiovascular: No chest pain, chest tightness, palpitations, paroxysmal nocturnal dyspnea, orthopnea, diaphoresis, dizziness / syncopal episode   Respiratory: No shortness of breath, dyspnea on exertion, cough, wheezing hemoptysis   Gastrointestinal: No abdominal pain, + nausea, vomiting, diarrhea, bloody stools   Genitourinary: No hematuria, dysuria   Neurological: No headache, tremors, numbness,  one-sided weakness    Musculoskeletal: No cramps, myalgias,  joint pain, joint swelling   Integument: No rash, edema         Personal History:      Past Medical History:   Diagnosis Date   • Abscess of arm, right 11/16/2022   • Heroin abuse    • Tobacco abuse        Past Surgical History:   Procedure Laterality Date   • INCISION AND DRAINAGE ARM Right 11/17/2022    Procedure: INCISION AND DRAINAGE UPPER  "EXTREMITY-FOREARM;  Surgeon: Jeremy Galindo MD;  Location: [facility] OR;  Service: Orthopedics;  Laterality: Right;       Family History   Problem Relation Age of Onset   • Diabetes insipidus Mother    • Diabetes insipidus Father    • Heart disease Maternal Grandmother    • Heart disease Maternal Grandfather        Social History     Tobacco Use   • Smoking status: Every Day     Packs/day: 1.00     Years: 16.00     Pack years: 16.00     Types: Cigarettes   • Smokeless tobacco: Never   Vaping Use   • Vaping Use: Some days   • Substances: Nicotine   • Devices: Disposable   Substance Use Topics   • Alcohol use: Never   • Drug use: Yes     Types: Heroin     Comment: used 4 days ago       Home meds:   No medications prior to admission.        Allergies:     Patient has no known allergies.    Scheduled Meds:cefTRIAXone, 1 g, Intravenous, Q24H  metoclopramide, 5 mg, Intravenous, Q6H  nicotine, 1 patch, Transdermal, Q24H  nortriptyline, 10 mg, Oral, Nightly  pantoprazole, 40 mg, Intravenous, BID AC  polyethylene glycol, 17 g, Oral, BID  sodium chloride, 10 mL, Intravenous, Q12H      Continuous Infusions:sodium chloride, 75 mL/hr, Last Rate: 75 mL/hr (04/03/23 2059)      PRN Meds:•  aluminum-magnesium hydroxide-simethicone  •  diphenhydrAMINE  •  hydrOXYzine  •  melatonin  •  ondansetron **OR** ondansetron  •  potassium chloride **OR** potassium chloride **OR** potassium chloride  •  [COMPLETED] Insert Peripheral IV **AND** sodium chloride  •  sodium chloride  •  sodium chloride      OBJECTIVE    Vital Signs  Vitals:    04/04/23 1000 04/04/23 1100 04/04/23 1256 04/04/23 1608   BP:   149/84 109/66   BP Location:   Left arm Left arm   Patient Position:   Lying Lying   Pulse: 61 63 64 62   Resp:   20 11   Temp:   98.6 °F (37 °C) 99.3 °F (37.4 °C)   TempSrc:   Oral Oral   SpO2: 93% 100% 96% 97%   Weight:       Height:           Flowsheet Rows    Flowsheet Row First Filed Value   Admission Height 177.8 cm (70\") Documented at " 04/03/2023 1119   Admission Weight 72.6 kg (160 lb) Documented at 04/03/2023 1119            Intake/Output Summary (Last 24 hours) at 4/4/2023 1840  Last data filed at 4/4/2023 1709  Gross per 24 hour   Intake 100 ml   Output --   Net 100 ml        Telemetry: Sinus bradycardia    Physical Exam:  The patient is alert, oriented and in no distress.  Ill-appearing  Vital signs as noted above.  Head and neck revealed no carotid bruits or jugular venous distention.  No thyromegaly or lymph adenopathy is present  Lungs clear.  No wheezing.  Breath sounds are normal bilaterally.  Heart normal first and second heart sounds.No murmur.  No precordial rub is present.  No gallop is present.  Abdomen soft and nontender.  No organomegaly is present.  Extremities with good peripheral pulses without any pedal edema.  Skin warm and dry.  Musculoskeletal system is grossly normal  CNS grossly normal          Results Review:  I have personally reviewed the results from the time of this admission to 4/4/2023 18:40 EDT and agree with these findings:  [x]  Laboratory  [x]  Microbiology  [x]  Radiology  [x]  EKG/Telemetry   [x]  Cardiology/Vascular   []  Pathology  [x]  Old records  []  Other:    Most notable findings include:     Lab Results (last 24 hours)     Procedure Component Value Units Date/Time    T3, Free [[id number]]  (Normal) Collected: 04/04/23 1019    Specimen: Blood Updated: 04/04/23 1705     T3, Free 2.01 pg/mL     Narrative:      Results may be falsely increased if patient taking Biotin.      T4, Free [[id number]]  (Normal) Collected: 04/04/23 1019    Specimen: Blood Updated: 04/04/23 1705     Free T4 1.37 ng/dL     Narrative:      Results may be falsely increased if patient taking Biotin.      AFP Tumor Marker [[id number]]  (Normal) Collected: 04/03/23 1223    Specimen: Blood Updated: 04/04/23 1637     ALPHA-FETOPROTEIN 3.18 ng/mL     Narrative:      Alpha Fetoprotein Tumor Marker Reference Range:    0.0-8.3 ng/mL    Note:  Normal values apply only to males and nonpregnant females. These results are not interpretable for pregnant females.    Results may be falsely decreased if patient taking Biotin.      Gamma GT [[id number]]  (Normal) Collected: 04/04/23 1019    Specimen: Blood Updated: 04/04/23 1620     GGT 38 U/L     Ferritin [[id number]]  (Normal) Collected: 04/04/23 1019    Specimen: Blood Updated: 04/04/23 1220     Ferritin 201.30 ng/mL     Narrative:      Results may be falsely decreased if patient taking Biotin.      Iron [[id number]]  (Normal) Collected: 04/04/23 1019    Specimen: Blood Updated: 04/04/23 1204     Iron 78 mcg/dL     Acetaminophen Level [[id number]]  (Normal) Collected: 04/04/23 1019    Specimen: Blood Updated: 04/04/23 1204     Acetaminophen <5.0 mcg/mL     Narrative:      Acetaminophen Therapeutic Range  5-20 ug/mL      Hours after ingestion            Toxic Value    4 Hours                           150 ug/mL    8 Hours                            70 ug/mL   12 Hours                            40 ug/mL   16 Hours                            20 ug/mL    These values apply to a single ingestion only.     TSH [[id number]]  (Abnormal) Collected: 04/04/23 1019    Specimen: Blood Updated: 04/04/23 1200     TSH 0.137 uIU/mL     Basic Metabolic Panel [[id number]]  (Abnormal) Collected: 04/04/23 1019    Specimen: Blood Updated: 04/04/23 1110     Glucose 80 mg/dL      BUN 14 mg/dL      Creatinine 0.67 mg/dL      Sodium 135 mmol/L      Potassium 3.6 mmol/L      Comment: Slight hemolysis detected by analyzer. Results may be affected.        Chloride 97 mmol/L      CO2 23.0 mmol/L      Calcium 8.8 mg/dL      BUN/Creatinine Ratio 20.9     Anion Gap 15.0 mmol/L      eGFR 128.0 mL/min/1.73     Narrative:      GFR Normal >60  Chronic Kidney Disease <60  Kidney Failure <15      CBC Auto Differential [[id number]]  (Abnormal) Collected: 04/04/23 1019    Specimen: Blood Updated: 04/04/23 1047     WBC 13.20 10*3/mm3      RBC 5.26 10*6/mm3       Hemoglobin 14.3 g/dL      Hematocrit 44.6 %      MCV 84.7 fL      MCH 27.2 pg      MCHC 32.1 g/dL      RDW 14.5 %      RDW-SD 42.4 fl      MPV 9.8 fL      Platelets 276 10*3/mm3      Neutrophil % 76.0 %      Lymphocyte % 18.0 %      Monocyte % 5.7 %      Eosinophil % 0.0 %      Basophil % 0.3 %      Neutrophils, Absolute 10.00 10*3/mm3      Lymphocytes, Absolute 2.40 10*3/mm3      Monocytes, Absolute 0.80 10*3/mm3      Eosinophils, Absolute 0.00 10*3/mm3      Basophils, Absolute 0.00 10*3/mm3      nRBC 0.0 /100 WBC     Urine Culture - Urine, Urine, Clean Catch [[id number]]  (Normal) Collected: 04/03/23 1627    Specimen: Urine, Clean Catch Updated: 04/04/23 1043     Urine Culture No growth    Blood Culture - Blood, Arm, Right [[id number]] Collected: 04/03/23 1836    Specimen: Blood from Arm, Right Updated: 04/03/23 2036    [facility] Draw [[id number]] Collected: 04/03/23 1223    Specimen: Blood Updated: 04/03/23 2015    Narrative:      The following orders were created for panel order [facility] Draw.  Procedure                               Abnormality         Status                     ---------                               -----------         ------                     Green Top (Gel)[[id number]]                                  Final result               Lavender Top[[id number]]                                     Final result               Gold Top - SST[[id number]]                                   Final result               Light Blue Top[[id number]]                                   Final result                 Please view results for these tests on the individual orders.    Light Blue Top [[id number]] Collected: 04/03/23 1913    Specimen: Blood from Arm, Left Updated: 04/03/23 2015     Extra Tube Hold for add-ons.     Comment: Auto resulted       Magnesium [[id number]]  (Normal) Collected: 04/03/23 1913    Specimen: Blood from Arm, Left Updated: 04/03/23 1941     Magnesium 1.9 mg/dL     Blood Culture - Blood, Arm,  Left [[id number]] Collected: 04/03/23 1913    Specimen: Blood from Arm, Left Updated: 04/03/23 1928    Lactic Acid, Plasma [[id number]]  (Normal) Collected: 04/03/23 1836    Specimen: Blood Updated: 04/03/23 1904     Lactate 1.1 mmol/L           Imaging Results (Last 24 Hours)     Procedure Component Value Units Date/Time    US Liver [[id number]] Collected: 04/04/23 1333     Updated: 04/04/23 1340    Narrative:      US LIVER    Date of Exam: 4/4/2023 12:17 PM EDT    Indication: elevated LFTs.    Comparison: No comparisons available.    Technique: Grayscale and color Doppler ultrasound evaluation of the right upper quadrant was performed.    Findings:  The background liver echogenicity is within normal limits for technique. There is no intrahepatic or ductal dilatation. No perihepatic ascites. There is hepatopedal flow in the portal vein. Visualized hepatic veins are patent. Common bile duct measures 3   mm.      Impression:      Impression:  No acute sonographic abnormality.    Electronically Signed: Venkatesh Ford    4/4/2023 1:37 PM EDT    Workstation ID: [id number]    XR Abdomen KUB [[id number]] Collected: 04/04/23 1333     Updated: 04/04/23 1336    Narrative:      XR ABDOMEN KUB    Date of Exam: 4/4/2023 12:37 PM EDT    Indication: nausea/vomiting.    Comparison: None available.    Findings:  There is a nonobstructive bowel gas pattern. Moderate stool is present throughout the colon. No pathological calcifications are identified. The osseous structures appear intact.      Impression:      Impression:  No acute process identified.    Electronically Signed: Norm Mcneal    4/4/2023 1:34 PM EDT    Workstation ID: [id number]          LAB RESULTS (LAST 7 DAYS)    CBC  Results from last 7 days   Lab Units 04/04/23  1019 04/03/23  1223   WBC 10*3/mm3 13.20* 16.30*   RBC 10*6/mm3 5.26 5.36   HEMOGLOBIN g/dL 14.3 14.7   HEMATOCRIT % 44.6 44.7   MCV fL 84.7 83.4   PLATELETS 10*3/mm3 276 287       BMP  Results from last 7 days    Lab Units 04/04/23  1019 04/03/23  1913 04/03/23  1223   SODIUM mmol/L 135*  --  137   POTASSIUM mmol/L 3.6  --  3.4*   CHLORIDE mmol/L 97*  --  94*   CO2 mmol/L 23.0  --  24.0   BUN mg/dL 14  --  16   CREATININE mg/dL 0.67*  --  0.78   GLUCOSE mg/dL 80  --  122*   MAGNESIUM mg/dL  --  1.9 1.8       CMP   Results from last 7 days   Lab Units 04/04/23  1019 04/03/23  1223   SODIUM mmol/L 135* 137   POTASSIUM mmol/L 3.6 3.4*   CHLORIDE mmol/L 97* 94*   CO2 mmol/L 23.0 24.0   BUN mg/dL 14 16   CREATININE mg/dL 0.67* 0.78   GLUCOSE mg/dL 80 122*   ALBUMIN g/dL  --  4.8   BILIRUBIN mg/dL  --  1.1   ALK PHOS U/L  --  111   AST (SGOT) U/L  --  67*   ALT (SGPT) U/L  --  113*       BNP        TROPONIN  Results from last 7 days   Lab Units 04/03/23  1223   CK TOTAL U/L 104       CoAg        Creatinine Clearance  Estimated Creatinine Clearance: 157.7 mL/min (A) (by C-G formula based on SCr of 0.67 mg/dL (L)).    ABG          Radiology  US Liver    Result Date: 4/4/2023  Impression: No acute sonographic abnormality. Electronically Signed: Venkatesh Ford  4/4/2023 1:37 PM EDT  Workstation ID: [id number]    XR Abdomen KUB    Result Date: 4/4/2023  Impression: No acute process identified. Electronically Signed: Norm Mcneal  4/4/2023 1:34 PM EDT  Workstation ID: [id number]        EKG  I personally viewed and interpreted the patient's EKG/Telemetry data:  ECG 12 Lead Chest Pain   Preliminary Result   HEART RATE= 54  bpm   RR Interval= 1104  ms   MN Interval= 167  ms   P Horizontal Axis= 20  deg   P Front Axis= 31  deg   QRSD Interval= 101  ms   QT Interval= 455  ms   QRS Axis= 85  deg   T Wave Axis= 37  deg   - BORDERLINE ECG -   Slow sinus arrhythmia   Probable lateral infarct, old   When compared with ECG of 03-Apr-2023 12:20:47,   Significant change in rhythm: previously ectopic atrial   Significant repolarization change   Electronically Signed By:    Date and Time of Study: 2023-04-04 09:43:30      ECG 12 Lead Rhythm Change    Final Result   HEART RATE= 59  bpm   RR Interval= 1016  ms   UT Interval= 173  ms   P Horizontal Axis= 50  deg   P Front Axis= -47  deg   QRSD Interval= 105  ms   QT Interval= 492  ms   QRS Axis= 75  deg   T Wave Axis= 25  deg   - BORDERLINE ECG -   Ectopic atrial bradycardia   ST elev, probable normal early repol pattern   When compared with ECG of 16-Nov-2022 15:11:29,   Significant change in rhythm: previously sinus   Electronically Signed By: Yaron Archuleta (Ben) 04-Apr-2023 14:31:58   Date and Time of Study: 2023-04-03 12:20:47      SCANNED - TELEMETRY     Final Result      SCANNED - TELEMETRY     Final Result      SCANNED - TELEMETRY     Final Result      SCANNED - TELEMETRY     Final Result            Echocardiogram:          Stress Test:        Cardiac Catheterization:  No results found for this or any previous visit.        Other:      ASSESSMENT & PLAN:    Principal Problem:    Intractable nausea and vomiting  Active Problems:    Acute hepatitis C virus infection without hepatic coma    Abnormal EKG    Heroin withdrawal    Tobacco abuse    Low TSH level    Acute UTI    Abnormal ECG  Review of his ECG suggests sinus bradycardia.  One of the ECG shows ectopic atrial rhythm.  ST elevation is likely secondary to early repolarization changes in this young man  Obtain an echocardiogram.  He will need MCOT at the time of discharge.    Intractable nausea and vomiting  Likely to be secondary to heroin withdrawal  We will treat with antiemetics.  Continue IV fluids.  Continue PPI and Reglan.  Consult gastroenterology.    Heroin withdrawal  Management as per primary team    New onset hepatitis C  Newly diagnosed.  Consult GI for possible treatment    Low TSH level  TSH is 0.137.  Check T3 and T4    UTI  He has been started on ceftriaxone  Pending urine cultures    Tobacco abuse/substance abuse  Counseling provided regarding abstinence from drug use.  Smoking cessation counseling also provided to the  patient        Lalo Whittaker MD  04/04/23  18:40 EDT

## 2023-04-04 NOTE — CASE MANAGEMENT/SOCIAL WORK
Discharge Planning Assessment  [facility]     Patient Name: Zach Blandon  MRN: [id number]  Today's Date: 4/4/2023    Admit Date: 4/3/2023    Plan: Return home with parents.   Discharge Needs Assessment     Row Name 04/04/23 1309       Living Environment    People in Home parent(s);sibling(s)    Name(s) of People in Home Mom-Catherine, David-Atul    Current Living Arrangements home    Potentially Unsafe Housing Conditions none    Primary Care Provided by self    Provides Primary Care For no one    Family Caregiver if Needed parent(s)    Quality of Family Relationships helpful;involved;supportive    Able to Return to Prior Arrangements yes       Resource/Environmental Concerns    Resource/Environmental Concerns none    Transportation Concerns none       Transition Planning    Patient/Family Anticipates Transition to home;home with family    Patient/Family Anticipated Services at Transition none    Transportation Anticipated family or friend will provide       Discharge Needs Assessment    Readmission Within the Last 30 Days no previous admission in last 30 days    Equipment Currently Used at Home none    Concerns to be Addressed no discharge needs identified;substance/tobacco abuse/use    Anticipated Changes Related to Illness none    Equipment Needed After Discharge none    Provided Post Acute Provider List? N/A    Provided Post Acute Provider Quality & Resource List? N/A               Discharge Plan     Row Name 04/04/23 8022       Plan    Plan Return home with parents.    Patient/Family in Agreement with Plan yes    Plan Comments CM and LSW met with pt and mom Catherine at bedside. Pt did not feel up to assessment, so mom completed questions. Pt lives at home with mom, stepdad, and stepbrother. Pt does not drive but parents provide transportation. Pharmacy confirmed and mom verified pt does not have PCP. No DME used at home. Mom to provide transportation at discharge. SDOH screen completed.              Expected  Discharge Date and Time     Expected Discharge Date Expected Discharge Time    Apr 5, 2023          Demographic Summary     Row Name 04/04/23 1304       General Information    Admission Type observation    Arrived From emergency department    Referral Source admission list    Reason for Consult discharge planning;care coordination/care conference    Preferred Language English       Contact Information    Permission Granted to Share Info With                Functional Status     Row Name 04/04/23 1304       Functional Status    Usual Activity Tolerance good    Current Activity Tolerance good       Functional Status, IADL    Medications independent    Meal Preparation independent    Housekeeping independent    Laundry independent    Shopping independent              Social Determinants of Health     Tobacco Use: High Risk   • Smoking Tobacco Use: Every Day   • Smokeless Tobacco Use: Never   • Passive Exposure: Not on file   Alcohol Use: Not At Risk   • Frequency of Alcohol Consumption: Never   • Average Number of Drinks: Patient does not drink   • Frequency of Binge Drinking: Never   Financial Resource Strain: Low Risk    • Difficulty of Paying Living Expenses: Not hard at all   Food Insecurity: No Food Insecurity   • Worried About Running Out of Food in the Last Year: Never true   • Ran Out of Food in the Last Year: Never true   Transportation Needs: No Transportation Needs   • Lack of Transportation (Medical): No   • Lack of Transportation (Non-Medical): No   Physical Activity: Sufficiently Active   • Days of Exercise per Week: 6 days   • Minutes of Exercise per Session: 150+ min   Stress: Not on file   Social Connections: Not on file   Intimate Partner Violence: Not on file   Depression: Not at risk   • PHQ-2 Score: 0   Housing Stability: Not on file       Met with patient in room wearing PPE: mask, face shield/goggles.      Maintained distance greater than six feet and spent less than 15 minutes in the  room.      Megan Naegele, RN      Office Phone: [phone]  Office Cell: [phone]

## 2023-04-05 ENCOUNTER — APPOINTMENT (OUTPATIENT)
Dept: CARDIOLOGY | Facility: HOSPITAL | Age: 33
End: 2023-04-05
Payer: COMMERCIAL

## 2023-04-05 VITALS
HEART RATE: 55 BPM | BODY MASS INDEX: 22.05 KG/M2 | TEMPERATURE: 98.4 F | HEIGHT: 70 IN | SYSTOLIC BLOOD PRESSURE: 146 MMHG | WEIGHT: 154 LBS | DIASTOLIC BLOOD PRESSURE: 92 MMHG | RESPIRATION RATE: 14 BRPM | OXYGEN SATURATION: 97 %

## 2023-04-05 PROBLEM — N39.0 ACUTE UTI: Status: RESOLVED | Noted: 2023-04-04 | Resolved: 2023-04-05

## 2023-04-05 PROBLEM — R11.2 INTRACTABLE NAUSEA AND VOMITING: Status: RESOLVED | Noted: 2023-04-04 | Resolved: 2023-04-05

## 2023-04-05 LAB
ALBUMIN SERPL-MCNC: 4.3 G/DL (ref 3.5–5.2)
ALBUMIN/GLOB SERPL: 1.4 G/DL
ALP SERPL-CCNC: 98 U/L (ref 39–117)
ALPHA1 GLOB MFR UR ELPH: 209 MG/DL (ref 90–200)
ALT SERPL W P-5'-P-CCNC: 61 U/L (ref 1–41)
ANION GAP SERPL CALCULATED.3IONS-SCNC: 14 MMOL/L (ref 5–15)
AST SERPL-CCNC: 45 U/L (ref 1–40)
BASOPHILS # BLD AUTO: 0.1 10*3/MM3 (ref 0–0.2)
BASOPHILS NFR BLD AUTO: 0.8 % (ref 0–1.5)
BH CV ECHO MEAS - ACS: 1.87 CM
BH CV ECHO MEAS - AO MAX PG: 9.5 MMHG
BH CV ECHO MEAS - AO MEAN PG: 5.1 MMHG
BH CV ECHO MEAS - AO ROOT DIAM: 2.9 CM
BH CV ECHO MEAS - AO V2 MAX: 154 CM/SEC
BH CV ECHO MEAS - AO V2 VTI: 37.3 CM
BH CV ECHO MEAS - AVA(I,D): 3 CM2
BH CV ECHO MEAS - EDV(CUBED): 136.4 ML
BH CV ECHO MEAS - EDV(MOD-SP4): 106.2 ML
BH CV ECHO MEAS - EF(MOD-SP4): 69.8 %
BH CV ECHO MEAS - ESV(CUBED): 33 ML
BH CV ECHO MEAS - ESV(MOD-SP4): 32.1 ML
BH CV ECHO MEAS - FS: 37.7 %
BH CV ECHO MEAS - IVS/LVPW: 1.21 CM
BH CV ECHO MEAS - IVSD: 0.94 CM
BH CV ECHO MEAS - LA DIMENSION: 3.2 CM
BH CV ECHO MEAS - LV DIASTOLIC VOL/BSA (35-75): 56.9 CM2
BH CV ECHO MEAS - LV MASS(C)D: 155.9 GRAMS
BH CV ECHO MEAS - LV MAX PG: 8.3 MMHG
BH CV ECHO MEAS - LV MEAN PG: 3.5 MMHG
BH CV ECHO MEAS - LV SYSTOLIC VOL/BSA (12-30): 17.2 CM2
BH CV ECHO MEAS - LV V1 MAX: 144.4 CM/SEC
BH CV ECHO MEAS - LV V1 VTI: 30.8 CM
BH CV ECHO MEAS - LVIDD: 5.1 CM
BH CV ECHO MEAS - LVIDS: 3.2 CM
BH CV ECHO MEAS - LVOT AREA: 3.6 CM2
BH CV ECHO MEAS - LVOT DIAM: 2.14 CM
BH CV ECHO MEAS - LVPWD: 0.77 CM
BH CV ECHO MEAS - MR MAX PG: 91.1 MMHG
BH CV ECHO MEAS - MR MAX VEL: 477.3 CM/SEC
BH CV ECHO MEAS - MV A MAX VEL: 42.2 CM/SEC
BH CV ECHO MEAS - MV DEC SLOPE: 470 CM/SEC2
BH CV ECHO MEAS - MV DEC TIME: 0.21 MSEC
BH CV ECHO MEAS - MV E MAX VEL: 99.8 CM/SEC
BH CV ECHO MEAS - MV E/A: 2.37
BH CV ECHO MEAS - MV MAX PG: 7.2 MMHG
BH CV ECHO MEAS - MV MEAN PG: 1.49 MMHG
BH CV ECHO MEAS - MV V2 VTI: 44.2 CM
BH CV ECHO MEAS - MVA(VTI): 2.5 CM2
BH CV ECHO MEAS - PA ACC TIME: 0.1 SEC
BH CV ECHO MEAS - PA PR(ACCEL): 32.3 MMHG
BH CV ECHO MEAS - PA V2 MAX: 61.7 CM/SEC
BH CV ECHO MEAS - PULM A REVS DUR: 0.1 SEC
BH CV ECHO MEAS - PULM A REVS VEL: 32.1 CM/SEC
BH CV ECHO MEAS - PULM DIAS VEL: 77.9 CM/SEC
BH CV ECHO MEAS - PULM S/D: 0.96
BH CV ECHO MEAS - PULM SYS VEL: 74.6 CM/SEC
BH CV ECHO MEAS - RAP SYSTOLE: 8 MMHG
BH CV ECHO MEAS - RV MAX PG: 3.4 MMHG
BH CV ECHO MEAS - RV V1 MAX: 92.6 CM/SEC
BH CV ECHO MEAS - RV V1 VTI: 24.6 CM
BH CV ECHO MEAS - RVDD: 2.7 CM
BH CV ECHO MEAS - RVSP: 36.1 MMHG
BH CV ECHO MEAS - SI(MOD-SP4): 39.7 ML/M2
BH CV ECHO MEAS - SV(LVOT): 110.9 ML
BH CV ECHO MEAS - SV(MOD-SP4): 74.1 ML
BH CV ECHO MEAS - TR MAX PG: 28.1 MMHG
BH CV ECHO MEAS - TR MAX VEL: 264.7 CM/SEC
BILIRUB CONJ SERPL-MCNC: 0.3 MG/DL (ref 0–0.3)
BILIRUB SERPL-MCNC: 0.9 MG/DL (ref 0–1.2)
BUN SERPL-MCNC: 12 MG/DL (ref 6–20)
BUN/CREAT SERPL: 18.8 (ref 7–25)
CALCIUM SPEC-SCNC: 9 MG/DL (ref 8.6–10.5)
CERULOPLASMIN SERPL-MCNC: 25 MG/DL (ref 16–31)
CHLORIDE SERPL-SCNC: 100 MMOL/L (ref 98–107)
CO2 SERPL-SCNC: 24 MMOL/L (ref 22–29)
CREAT SERPL-MCNC: 0.64 MG/DL (ref 0.76–1.27)
DEPRECATED RDW RBC AUTO: 41.1 FL (ref 37–54)
EGFRCR SERPLBLD CKD-EPI 2021: 129.8 ML/MIN/1.73
EOSINOPHIL # BLD AUTO: 0 10*3/MM3 (ref 0–0.4)
EOSINOPHIL NFR BLD AUTO: 0.1 % (ref 0.3–6.2)
ERYTHROCYTE [DISTWIDTH] IN BLOOD BY AUTOMATED COUNT: 14 % (ref 12.3–15.4)
GLOBULIN UR ELPH-MCNC: 3.1 GM/DL
GLUCOSE SERPL-MCNC: 86 MG/DL (ref 65–99)
HCT VFR BLD AUTO: 44.4 % (ref 37.5–51)
HGB BLD-MCNC: 14.5 G/DL (ref 13–17.7)
INR PPP: 1.16 (ref 0.93–1.1)
LIPASE SERPL-CCNC: 29 U/L (ref 13–60)
LYMPHOCYTES # BLD AUTO: 3.3 10*3/MM3 (ref 0.7–3.1)
LYMPHOCYTES NFR BLD AUTO: 36.7 % (ref 19.6–45.3)
MAGNESIUM SERPL-MCNC: 2.1 MG/DL (ref 1.6–2.6)
MAXIMAL PREDICTED HEART RATE: 189 BPM
MCH RBC QN AUTO: 27.5 PG (ref 26.6–33)
MCHC RBC AUTO-ENTMCNC: 32.6 G/DL (ref 31.5–35.7)
MCV RBC AUTO: 84.2 FL (ref 79–97)
MONOCYTES # BLD AUTO: 0.6 10*3/MM3 (ref 0.1–0.9)
MONOCYTES NFR BLD AUTO: 7.1 % (ref 5–12)
NEUTROPHILS NFR BLD AUTO: 4.9 10*3/MM3 (ref 1.7–7)
NEUTROPHILS NFR BLD AUTO: 55.3 % (ref 42.7–76)
NRBC BLD AUTO-RTO: 0.2 /100 WBC (ref 0–0.2)
PHOSPHATE SERPL-MCNC: 2.9 MG/DL (ref 2.5–4.5)
PLATELET # BLD AUTO: 276 10*3/MM3 (ref 140–450)
PMV BLD AUTO: 9.8 FL (ref 6–12)
POTASSIUM SERPL-SCNC: 3.7 MMOL/L (ref 3.5–5.2)
PROT SERPL-MCNC: 7.4 G/DL (ref 6–8.5)
PROTHROMBIN TIME: 11.8 SECONDS (ref 9.6–11.7)
RBC # BLD AUTO: 5.27 10*6/MM3 (ref 4.14–5.8)
SODIUM SERPL-SCNC: 138 MMOL/L (ref 136–145)
STRESS TARGET HR: 161 BPM
WBC NRBC COR # BLD: 8.9 10*3/MM3 (ref 3.4–10.8)

## 2023-04-05 PROCEDURE — 82248 BILIRUBIN DIRECT: CPT | Performed by: INTERNAL MEDICINE

## 2023-04-05 PROCEDURE — 83690 ASSAY OF LIPASE: CPT | Performed by: NURSE PRACTITIONER

## 2023-04-05 PROCEDURE — 83735 ASSAY OF MAGNESIUM: CPT | Performed by: NURSE PRACTITIONER

## 2023-04-05 PROCEDURE — 96376 TX/PRO/DX INJ SAME DRUG ADON: CPT

## 2023-04-05 PROCEDURE — 87902 NFCT AGT GNTYP ALYS HEP C: CPT | Performed by: NURSE PRACTITIONER

## 2023-04-05 PROCEDURE — 86381 MITOCHONDRIAL ANTIBODY EACH: CPT | Performed by: NURSE PRACTITIONER

## 2023-04-05 PROCEDURE — G0378 HOSPITAL OBSERVATION PER HR: HCPCS

## 2023-04-05 PROCEDURE — 86015 ACTIN ANTIBODY EACH: CPT | Performed by: NURSE PRACTITIONER

## 2023-04-05 PROCEDURE — 25010000002 ONDANSETRON PER 1 MG

## 2023-04-05 PROCEDURE — 80053 COMPREHEN METABOLIC PANEL: CPT | Performed by: NURSE PRACTITIONER

## 2023-04-05 PROCEDURE — 82103 ALPHA-1-ANTITRYPSIN TOTAL: CPT | Performed by: NURSE PRACTITIONER

## 2023-04-05 PROCEDURE — 25010000002 DIPHENHYDRAMINE PER 50 MG: Performed by: INTERNAL MEDICINE

## 2023-04-05 PROCEDURE — 93306 TTE W/DOPPLER COMPLETE: CPT | Performed by: INTERNAL MEDICINE

## 2023-04-05 PROCEDURE — 85025 COMPLETE CBC W/AUTO DIFF WBC: CPT | Performed by: NURSE PRACTITIONER

## 2023-04-05 PROCEDURE — 86376 MICROSOMAL ANTIBODY EACH: CPT | Performed by: NURSE PRACTITIONER

## 2023-04-05 PROCEDURE — 84100 ASSAY OF PHOSPHORUS: CPT | Performed by: INTERNAL MEDICINE

## 2023-04-05 PROCEDURE — 85610 PROTHROMBIN TIME: CPT | Performed by: INTERNAL MEDICINE

## 2023-04-05 PROCEDURE — 87522 HEPATITIS C REVRS TRNSCRPJ: CPT | Performed by: NURSE PRACTITIONER

## 2023-04-05 PROCEDURE — 99232 SBSQ HOSP IP/OBS MODERATE 35: CPT | Performed by: INTERNAL MEDICINE

## 2023-04-05 PROCEDURE — 25010000002 METOCLOPRAMIDE PER 10 MG: Performed by: INTERNAL MEDICINE

## 2023-04-05 PROCEDURE — 86038 ANTINUCLEAR ANTIBODIES: CPT | Performed by: NURSE PRACTITIONER

## 2023-04-05 PROCEDURE — 82390 ASSAY OF CERULOPLASMIN: CPT | Performed by: NURSE PRACTITIONER

## 2023-04-05 PROCEDURE — 93306 TTE W/DOPPLER COMPLETE: CPT

## 2023-04-05 RX ORDER — NICOTINE 21 MG/24HR
1 PATCH, TRANSDERMAL 24 HOURS TRANSDERMAL EVERY 24 HOURS
Qty: 30 PATCH | Refills: 0 | Status: SHIPPED | OUTPATIENT
Start: 2023-04-05 | End: 2023-05-05

## 2023-04-05 RX ORDER — LACTULOSE 10 G/15ML
20 SOLUTION ORAL 2 TIMES DAILY
Qty: 600 ML | Refills: 0 | Status: SHIPPED | OUTPATIENT
Start: 2023-04-05 | End: 2023-04-15

## 2023-04-05 RX ORDER — CEFDINIR 300 MG/1
300 CAPSULE ORAL 2 TIMES DAILY
Qty: 6 CAPSULE | Refills: 0 | Status: SHIPPED | OUTPATIENT
Start: 2023-04-05 | End: 2023-04-08

## 2023-04-05 RX ORDER — SORBITOL SOLUTION 70 %
45 SOLUTION, ORAL MISCELLANEOUS ONCE
Status: DISCONTINUED | OUTPATIENT
Start: 2023-04-05 | End: 2023-04-05 | Stop reason: HOSPADM

## 2023-04-05 RX ORDER — METOCLOPRAMIDE 10 MG/1
10 TABLET ORAL
Qty: 40 TABLET | Refills: 0 | Status: SHIPPED | OUTPATIENT
Start: 2023-04-05 | End: 2023-04-15

## 2023-04-05 RX ORDER — PANTOPRAZOLE SODIUM 40 MG/1
40 TABLET, DELAYED RELEASE ORAL DAILY
Qty: 30 TABLET | Refills: 0 | Status: SHIPPED | OUTPATIENT
Start: 2023-04-05 | End: 2023-05-05

## 2023-04-05 RX ADMIN — METOCLOPRAMIDE 5 MG: 5 INJECTION, SOLUTION INTRAMUSCULAR; INTRAVENOUS at 00:39

## 2023-04-05 RX ADMIN — HYDROXYZINE HYDROCHLORIDE 25 MG: 25 TABLET, FILM COATED ORAL at 05:08

## 2023-04-05 RX ADMIN — ONDANSETRON 4 MG: 2 INJECTION INTRAMUSCULAR; INTRAVENOUS at 09:46

## 2023-04-05 RX ADMIN — METOCLOPRAMIDE 5 MG: 5 INJECTION, SOLUTION INTRAMUSCULAR; INTRAVENOUS at 11:25

## 2023-04-05 RX ADMIN — POLYETHYLENE GLYCOL 3350 17 G: 17 POWDER, FOR SOLUTION ORAL at 09:46

## 2023-04-05 RX ADMIN — DIPHENHYDRAMINE HYDROCHLORIDE 25 MG: 50 INJECTION, SOLUTION INTRAMUSCULAR; INTRAVENOUS at 02:41

## 2023-04-05 RX ADMIN — Medication 10 ML: at 10:01

## 2023-04-05 RX ADMIN — METOCLOPRAMIDE 5 MG: 5 INJECTION, SOLUTION INTRAMUSCULAR; INTRAVENOUS at 05:08

## 2023-04-05 RX ADMIN — PANTOPRAZOLE SODIUM 40 MG: 40 INJECTION, POWDER, LYOPHILIZED, FOR SOLUTION INTRAVENOUS at 09:46

## 2023-04-05 NOTE — CASE MANAGEMENT/SOCIAL WORK
Case Management Discharge Note      Final Note: Routine home.    Selected Continued Care - Discharged on 4/5/2023 Admission date: 4/3/2023 - Discharge disposition: Home or Self Care     Transportation Services  Private: Car    Final Discharge Disposition Code: 01 - home or self-care

## 2023-04-05 NOTE — PLAN OF CARE
Problem: UTI (Urinary Tract Infection)  Goal: Improved Infection Symptoms  Outcome: Ongoing, Not Progressing     Problem: Fluid Volume Deficit  Goal: Fluid Balance  Outcome: Ongoing, Not Progressing   Goal Outcome Evaluation: decline     Alert and oriented x 4. Able to verbalize needs and wants. Takes medication whole and tolerates well. Continent of bowel and bladder. Independent for transfers/ambulation. Continues to receive IV Rocephin, no s/s of adverse/allergic reaction noted. NaCl discontinued. Compliant with clear/liquid diet. C/O nausea, no emesis noted. Continues to receive scheduled IV Reglan and PRN IV Zofran, minimal positive effect noted. Does not require O2 therapy at this time. Continues to be followed by cardiology and gastroenterology. Cardiology recommending MCOT at discharge. Currently in bed, awake. Call bell in reach. Mother at bedside. WBC continues to be elevated but trending down at this time. Discharge plan is to go home with parents.

## 2023-04-05 NOTE — PROGRESS NOTES
LOS: 0 days   Patient Care Team:  Provider, No Known as PCP - General  Provider, No Known as PCP - Family Medicine      Subjective     Subjective: Patient feeling much better this morning and asking to eat.  No further vomiting overnight.  Has not had a bowel movement.      ROS:   Review of Systems   Constitutional: Negative for chills and fever.   Respiratory: Negative for cough and shortness of breath.    Cardiovascular: Negative for chest pain and palpitations.   Gastrointestinal: Positive for constipation. Negative for abdominal pain, nausea and vomiting.   Neurological: Negative for dizziness and weakness.   Psychiatric/Behavioral: Negative for agitation and confusion.        Medication Review:   Scheduled Meds:cefTRIAXone, 1 g, Intravenous, Q24H  metoclopramide, 5 mg, Intravenous, Q6H  nicotine, 1 patch, Transdermal, Q24H  nortriptyline, 10 mg, Oral, Nightly  pantoprazole, 40 mg, Intravenous, BID AC  polyethylene glycol, 17 g, Oral, BID  sodium chloride, 10 mL, Intravenous, Q12H  sorbitol, 45 mL, Oral, Once      Continuous Infusions:   PRN Meds:.•  aluminum-magnesium hydroxide-simethicone  •  diphenhydrAMINE  •  hydrOXYzine  •  melatonin  •  ondansetron **OR** ondansetron  •  potassium chloride **OR** potassium chloride **OR** potassium chloride  •  [COMPLETED] Insert Peripheral IV **AND** sodium chloride  •  sodium chloride  •  sodium chloride      Objective     Vital Signs  Temp:  [97.7 °F (36.5 °C)-99.3 °F (37.4 °C)] 98.3 °F (36.8 °C)  Heart Rate:  [57-64] 58  Resp:  [11-20] 15  BP: (109-149)/(66-92) 146/92    Physical Exam:    General Appearance:    Awake and alert, in no acute distress   Head:    Normocephalic, without obvious abnormality   Eyes:          Conjunctivae normal, anicteric sclera   Ears:    Hearing intact           Lungs:     Respirations regular, even and unlabored       Abdomen:     Soft, non-tender, no rebound or guarding, non-distended, no hepatosplenomegaly   Rectal:     Deferred    Extremities:   No edema, no cyanosis, no redness   Skin:   No bleeding, bruising or rash, no jaundice   Neurologic:   Cranial nerves 2 - 12 grossly intact, no asterixis, sensation   intact        Results Review:    CBC  Results from last 7 days   Lab Units 04/04/23  1019 04/03/23  1223   RBC 10*6/mm3 5.26 5.36   WBC 10*3/mm3 13.20* 16.30*   HEMOGLOBIN g/dL 14.3 14.7   PLATELETS 10*3/mm3 276 287       CMP  Results from last 7 days   Lab Units 04/04/23  1019 04/03/23  1223   SODIUM mmol/L 135* 137   POTASSIUM mmol/L 3.6 3.4*   CHLORIDE mmol/L 97* 94*   CO2 mmol/L 23.0 24.0   BUN mg/dL 14 16   CREATININE mg/dL 0.67* 0.78   GLUCOSE mg/dL 80 122*   ALBUMIN g/dL  --  4.8   BILIRUBIN mg/dL  --  1.1   ALK PHOS U/L  --  111   AST (SGOT) U/L  --  67*   ALT (SGPT) U/L  --  113*       Amylase and Lipase        CRP         Imaging Results (Last 24 Hours)     Procedure Component Value Units Date/Time    US Liver [[id number]] Collected: 04/04/23 1333     Updated: 04/04/23 1340    Narrative:      US LIVER    Date of Exam: 4/4/2023 12:17 PM EDT    Indication: elevated LFTs.    Comparison: No comparisons available.    Technique: Grayscale and color Doppler ultrasound evaluation of the right upper quadrant was performed.    Findings:  The background liver echogenicity is within normal limits for technique. There is no intrahepatic or ductal dilatation. No perihepatic ascites. There is hepatopedal flow in the portal vein. Visualized hepatic veins are patent. Common bile duct measures 3   mm.      Impression:      Impression:  No acute sonographic abnormality.    Electronically Signed: Venkatesh Ford    4/4/2023 1:37 PM EDT    Workstation ID: [id number]    XR Abdomen KUB [[id number]] Collected: 04/04/23 1333     Updated: 04/04/23 1336    Narrative:      XR ABDOMEN KUB    Date of Exam: 4/4/2023 12:37 PM EDT    Indication: nausea/vomiting.    Comparison: None available.    Findings:  There is a nonobstructive bowel gas pattern. Moderate stool  is present throughout the colon. No pathological calcifications are identified. The osseous structures appear intact.      Impression:      Impression:  No acute process identified.    Electronically Signed: Norm Fredis    4/4/2023 1:34 PM EDT    Workstation ID: [id number]            Assessment & Plan     32-year-old male presented to the hospital 4/3/2023 with nausea/vomiting.  Has history of recent IV drug use as well as chronic hepatitis C diagnosed years ago.  GI asked to consult due to elevated liver enzymes.     Elevated liver enzymes  History of chronic hepatitis C diagnosed years ago -treatment naïve  Nausea/vomiting  Leukocytosis  IV drug use  UTI      Plan:  Patient feeling much better today.  No abdominal pain or nausea/vomiting overnight.  He is asking to eat.  Liver enzymes are mildly elevated and liver serologies were sent.  So far these are unremarkable.  HCVRNA was also sent.  KUB did show some constipation.  Liver ultrasound normal.  Plan to give sorbitol today to cleanse his bowels and recommend he be discharged on lactulose as outpatient to treat his constipation.  He can be discharged home today from GI standpoint on PPI as well as antiemetics.  Advance to regular diet.  We will plan to follow-up on further labs and treat his chronic hepatitis C as outpatient.      Jeanna Whitfield, XIOMARA  04/05/23  09:50 EDT

## 2023-04-05 NOTE — DISCHARGE SUMMARY
[facility]  Discharge Summary    Date of Service: 23   Patient Name: Zach Blandon  : 1990  MRN: [id number]    Date of Admission: 4/3/2023  Discharge Diagnosis:    Diagnosis Plan   1. Acute hepatitis C virus infection without hepatic coma        2. Intractable vomiting with nausea        3. Heroin withdrawal        4. IV drug abuse        5. Acute cystitis without hematuria        6.  Sinus bradycardia with ST segment changes  Date of Discharge:  23   Primary Care Physician: Provider, No Known      Presenting Problem:   Heroin withdrawal [F11.93]  IV drug abuse [F19.10]  Acute cystitis without hematuria [N30.00]  Acute hepatitis C virus infection without hepatic coma [B17.10]  Intractable vomiting with nausea [R11.2]    Active and Resolved Hospital Problems:  Active Hospital Problems    Diagnosis POA   • Abnormal EKG [R94.31] Yes   • Low TSH level [R79.89] Yes   • Tobacco abuse [Z72.0] Yes   • Acute hepatitis C virus infection without hepatic coma [B17.10] Yes   • Heroin withdrawal [F11.93] Yes      Resolved Hospital Problems    Diagnosis POA   • **Intractable nausea and vomiting [R11.2] Yes   • Acute UTI [N39.0] Yes         Hospital Course     Hospital Course:  Zach Blandon is a 32 y.o. male came intractable nausea vomiting, abdominal pain.  Found to have elevated LFTs.  History of hep C infection as well.  GI recs were appreciated, will follow-up outpatient for further hep C treatment.  Cardiology also evaluated patient for bradycardia with some ST segment changes.  Will receive outpatient cardiac telemetry and follow-up with them as well in 2 to 3 weeks.  Patient was then tolerating p.o. intake, feeling very well, remained stable for discharge and mother at bedside agreeable with plan.        DISCHARGE Follow Up Recommendations for labs and diagnostics: None at this time      Reasons For Change In Medications and Indications for New  Medications:      Day of Discharge     Vital Signs:  Temp:  [97.7 °F (36.5 °C)-99.3 °F (37.4 °C)] 98.4 °F (36.9 °C)  Heart Rate:  [55-64] 55  Resp:  [11-20] 14  BP: (109-149)/(66-92) 119/72    Physical Exam:  Physical Exam  Vitals and nursing note reviewed.   Constitutional:       General: He is not in acute distress.     Appearance: Normal appearance. He is normal weight. He is not ill-appearing.   HENT:      Head: Normocephalic and atraumatic.      Mouth/Throat:      Mouth: Mucous membranes are moist.      Pharynx: Oropharynx is clear.   Eyes:      Extraocular Movements: Extraocular movements intact.      Conjunctiva/sclera: Conjunctivae normal.      Pupils: Pupils are equal, round, and reactive to light.   Cardiovascular:      Rate and Rhythm: Normal rate and regular rhythm.      Pulses: Normal pulses.      Heart sounds: Normal heart sounds.   Pulmonary:      Effort: Pulmonary effort is normal. No respiratory distress.      Breath sounds: Normal breath sounds. No wheezing.   Abdominal:      General: Abdomen is flat. Bowel sounds are normal. There is no distension.      Palpations: Abdomen is soft.      Tenderness: There is no abdominal tenderness. There is no guarding.   Musculoskeletal:         General: No swelling, tenderness or signs of injury.      Cervical back: Normal range of motion and neck supple.   Skin:     General: Skin is warm and dry.      Capillary Refill: Capillary refill takes less than 2 seconds.      Comments: Significant tattoos all over body including face   Neurological:      General: No focal deficit present.      Mental Status: He is alert and oriented to person, place, and time. Mental status is at baseline.      Motor: No weakness.      Gait: Gait normal.   Psychiatric:         Mood and Affect: Mood normal.         Behavior: Behavior normal.         Judgment: Judgment normal.           Pertinent  and/or Most Recent Results     LAB RESULTS:      Lab 04/05/23  1000 04/04/23  1019  04/03/23  1836 04/03/23  1223   WBC 8.90 13.20*  --  16.30*   HEMOGLOBIN 14.5 14.3  --  14.7   HEMATOCRIT 44.4 44.6  --  44.7   PLATELETS 276 276  --  287   NEUTROS ABS 4.90 10.00*  --  14.90*   LYMPHS ABS 3.30* 2.40  --  0.90   MONOS ABS 0.60 0.80  --  0.40   EOS ABS 0.00 0.00  --  0.00   MCV 84.2 84.7  --  83.4   LACTATE  --   --  1.1  --    PROTIME 11.8*  --   --   --          Lab 04/05/23  1000 04/04/23  1019 04/03/23 1913 04/03/23  1223   SODIUM 138 135*  --  137   POTASSIUM 3.7 3.6  --  3.4*   CHLORIDE 100 97*  --  94*   CO2 24.0 23.0  --  24.0   ANION GAP 14.0 15.0  --  19.0*   BUN 12 14  --  16   CREATININE 0.64* 0.67*  --  0.78   EGFR 129.8 128.0  --  122.3   GLUCOSE 86 80  --  122*   CALCIUM 9.0 8.8  --  9.5   MAGNESIUM 2.1  --  1.9 1.8   PHOSPHORUS 2.9  --   --   --    TSH  --  0.137*  --   --          Lab 04/05/23  1000 04/03/23  1223   TOTAL PROTEIN 7.4 8.2   ALBUMIN 4.3 4.8   GLOBULIN 3.1 3.4   ALT (SGPT) 61* 113*   AST (SGOT) 45* 67*   BILIRUBIN 0.9 1.1   BILIRUBIN DIRECT 0.3  --    ALK PHOS 98 111   LIPASE 29  --          Lab 04/05/23  1000   PROTIME 11.8*   INR 1.16*             Lab 04/04/23  1019   IRON 78   FERRITIN 201.30         Brief Urine Lab Results  (Last result in the past 365 days)      Color   Clarity   Blood   Leuk Est   Nitrite   Protein   CREAT   Urine HCG        04/03/23 1627 Dark Yellow   Clear   Negative   Trace   Positive   100 mg/dL (2+)               Microbiology Results (last 10 days)     Procedure Component Value - Date/Time    Blood Culture - Blood, Arm, Left [[id number]]  (Normal) Collected: 04/03/23 1913    Lab Status: Preliminary result Specimen: Blood from Arm, Left Updated: 04/04/23 1932     Blood Culture No growth at 24 hours    Narrative:      Less than seven (7) mL's of blood was collected.  Insufficient quantity may yield false negative results.    Blood Culture - Blood, Arm, Right [[id number]]  (Normal) Collected: 04/03/23 1836    Lab Status: Preliminary result  Specimen: Blood from Arm, Right Updated: 04/04/23 2045     Blood Culture No growth at 24 hours    Urine Culture - Urine, Urine, Clean Catch [[id number]]  (Normal) Collected: 04/03/23 1627    Lab Status: Final result Specimen: Urine, Clean Catch Updated: 04/04/23 2138     Urine Culture No growth          US Liver    Result Date: 4/4/2023  Impression: Impression: No acute sonographic abnormality. Electronically Signed: Venkatesh Ford  4/4/2023 1:37 PM EDT  Workstation ID: [id number]    XR Abdomen KUB    Result Date: 4/4/2023  Impression: Impression: No acute process identified. Electronically Signed: Norm Mcneal  4/4/2023 1:34 PM EDT  Workstation ID: [id number]      Results for orders placed during the hospital encounter of 11/16/22    Duplex Venous Upper Extremity - Right CAR    Interpretation Summary  •  The deep and superficial veins of the forearm were not imaged due to patient intolerance.  •  All other right sided vessels appear normal.      Results for orders placed during the hospital encounter of 11/16/22    Duplex Venous Upper Extremity - Right CAR    Interpretation Summary  •  The deep and superficial veins of the forearm were not imaged due to patient intolerance.  •  All other right sided vessels appear normal.          Labs Pending at Discharge:  Pending Labs     Order Current Status    JANINE In process    Alpha - 1 - Antitrypsin In process    Anti-Smooth Muscle Antibody Titer In process    Anti-microsomal Antibody In process    Ceruloplasmin In process    Hepatitis C Genotype In process    Hepatitis C RNA, Quantitative, PCR (graph) In process    Mitochondrial Antibodies, M2 In process    Blood Culture - Blood, Arm, Left Preliminary result    Blood Culture - Blood, Arm, Right Preliminary result          Procedures Performed           Consults:   Consults     Date and Time Order Name Status Description    4/4/2023 10:24 AM Inpatient Cardiology Consult Completed     4/3/2023  5:18 PM Inpatient Gastroenterology  Consult      4/3/2023  4:59 PM Hospitalist (on-call MD unless specified)              Discharge Details        Discharge Medications      New Medications      Instructions Start Date   cefdinir 300 MG capsule  Commonly known as: OMNICEF   300 mg, Oral, 2 Times Daily      lactulose 10 GM/15ML solution  Commonly known as: CHRONULAC   20 g, Oral, 2 Times Daily      metoclopramide 10 MG tablet  Commonly known as: Reglan   10 mg, Oral, 4 Times Daily Before Meals & Nightly      nicotine 21 MG/24HR patch  Commonly known as: NICODERM CQ   1 patch, Transdermal, Every 24 Hours      pantoprazole 40 MG EC tablet  Commonly known as: PROTONIX   40 mg, Oral, Daily             No Known Allergies      Discharge Disposition: Stable to DC to   Home or Self Care    Diet:  Hospital:  Diet Order   Procedures   • Diet: Regular/House Diet; Texture: Regular Texture (IDDSI 7); Fluid Consistency: Thin (IDDSI 0)         Discharge Activity:         CODE STATUS:  Code Status and Medical Interventions:   Ordered at: 04/03/23 1840     Level Of Support Discussed With:    Patient     Code Status (Patient has no pulse and is not breathing):    CPR (Attempt to Resuscitate)     Medical Interventions (Patient has pulse or is breathing):    Full Support     Release to patient:    Routine Release         No future appointments.        Time spent on Discharge including face to face service: 36 minutes    This patient has been examined wearing appropriate Personal Protective Equipment and discussed with Nurse. 04/05/23      Signature: Electronically signed by Karen Haywood DO, 04/05/23, 12:34 EDT.  [facility] Hospitalist Team

## 2023-04-05 NOTE — PROGRESS NOTES
Referring Provider: Karen Haywood DO    Reason for follow-up: Abnormal ECG     Patient Care Team:  Provider, No Known as PCP - General  Provider, No Known as PCP - Family Medicine      SUBJECTIVE  Feels better, denies any chest pain or shortness of breath.  Wants to go to rehab     ROS  Review of all systems negative except as indicated.    Since I have last seen, the patient has been without any chest discomfort, shortness of breath, palpitations, dizziness or syncope.  Denies having any headache, abdominal pain, nausea, vomiting, diarrhea, constipation, loss of weight or loss of appetite.  Denies having any excessive bruising, hematuria or blood in the stool.  ROS      Personal History:    Past Medical History:   Diagnosis Date   • Abscess of arm, right 11/16/2022   • Heroin abuse    • Tobacco abuse        Past Surgical History:   Procedure Laterality Date   • INCISION AND DRAINAGE ARM Right 11/17/2022    Procedure: INCISION AND DRAINAGE UPPER EXTREMITY-FOREARM;  Surgeon: Jeremy Galindo MD;  Location: [facility];  Service: Orthopedics;  Laterality: Right;       Family History   Problem Relation Age of Onset   • Diabetes insipidus Mother    • Diabetes insipidus Father    • Heart disease Maternal Grandmother    • Heart disease Maternal Grandfather        Social History     Tobacco Use   • Smoking status: Every Day     Packs/day: 1.00     Years: 16.00     Pack years: 16.00     Types: Cigarettes   • Smokeless tobacco: Never   Vaping Use   • Vaping Use: Some days   • Substances: Nicotine   • Devices: Disposable   Substance Use Topics   • Alcohol use: Never   • Drug use: Yes     Types: Heroin     Comment: used 4 days ago        No medications prior to admission.       Allergies:  Patient has no known allergies.    Scheduled Meds:cefTRIAXone, 1 g, Intravenous, Q24H  metoclopramide, 5 mg, Intravenous, Q6H  nicotine, 1 patch, Transdermal, Q24H  nortriptyline, 10 mg, Oral, Nightly  pantoprazole, 40 mg, Intravenous,  "BID AC  polyethylene glycol, 17 g, Oral, BID  sodium chloride, 10 mL, Intravenous, Q12H      Continuous Infusions:   PRN Meds:.•  aluminum-magnesium hydroxide-simethicone  •  diphenhydrAMINE  •  hydrOXYzine  •  melatonin  •  ondansetron **OR** ondansetron  •  potassium chloride **OR** potassium chloride **OR** potassium chloride  •  [COMPLETED] Insert Peripheral IV **AND** sodium chloride  •  sodium chloride  •  sodium chloride      OBJECTIVE    Vital Signs  Vitals:    04/04/23 2003 04/04/23 2329 04/05/23 0409 04/05/23 0833   BP: 133/84 124/75 139/75 146/92   BP Location: Left arm Left arm Left arm Left arm   Patient Position: Lying Lying Lying Lying   Pulse: 61  57 58   Resp: 18 16 11 15   Temp: 98.9 °F (37.2 °C) 98.5 °F (36.9 °C) 97.7 °F (36.5 °C) 98.3 °F (36.8 °C)   TempSrc: Axillary Oral Oral Oral   SpO2: 93%  98% 96%   Weight:   69.9 kg (154 lb 3.2 oz)    Height:           Flowsheet Rows    Flowsheet Row First Filed Value   Admission Height 177.8 cm (70\") Documented at 04/03/2023 1119   Admission Weight 72.6 kg (160 lb) Documented at 04/03/2023 1119            Intake/Output Summary (Last 24 hours) at 4/5/2023 0857  Last data filed at 4/5/2023 0409  Gross per 24 hour   Intake 100 ml   Output 1050 ml   Net -950 ml          Telemetry: Sinus rhythm    Physical Exam:  The patient is alert, oriented and in no distress.  Vital signs as noted above.  Head and neck revealed no carotid bruits or jugular venous distention.  No thyromegaly or lymphadenopathy is present  Lungs clear.  No wheezing.  Breath sounds are normal bilaterally.  Heart normal first and second heart sounds.  No murmur. No precordial rub is present.  No gallop is present.  Abdomen soft and nontender.  No organomegaly is present.  Extremities with good peripheral pulses without any pedal edema.  Skin warm and dry.  Musculoskeletal system is grossly normal.  CNS grossly normal.       Results Review:  I have personally reviewed the results from the time " of this admission to 4/5/2023 08:57 EDT and agree with these findings:  []  Laboratory  []  Microbiology  []  Radiology  []  EKG/Telemetry   []  Cardiology/Vascular   []  Pathology  []  Old records  []  Other:    Most notable findings include:    Lab Results (last 24 hours)     Procedure Component Value Units Date/Time    Urine Culture - Urine, Urine, Clean Catch [[id number]]  (Normal) Collected: 04/03/23 1627    Specimen: Urine, Clean Catch Updated: 04/04/23 2138     Urine Culture No growth    Blood Culture - Blood, Arm, Right [[id number]]  (Normal) Collected: 04/03/23 1836    Specimen: Blood from Arm, Right Updated: 04/04/23 2045     Blood Culture No growth at 24 hours    Blood Culture - Blood, Arm, Left [[id number]]  (Normal) Collected: 04/03/23 1913    Specimen: Blood from Arm, Left Updated: 04/04/23 1932     Blood Culture No growth at 24 hours    Narrative:      Less than seven (7) mL's of blood was collected.  Insufficient quantity may yield false negative results.    T3, Free [[id number]]  (Normal) Collected: 04/04/23 1019    Specimen: Blood Updated: 04/04/23 1705     T3, Free 2.01 pg/mL     Narrative:      Results may be falsely increased if patient taking Biotin.      T4, Free [[id number]]  (Normal) Collected: 04/04/23 1019    Specimen: Blood Updated: 04/04/23 1705     Free T4 1.37 ng/dL     Narrative:      Results may be falsely increased if patient taking Biotin.      AFP Tumor Marker [[id number]]  (Normal) Collected: 04/03/23 1223    Specimen: Blood Updated: 04/04/23 1637     ALPHA-FETOPROTEIN 3.18 ng/mL     Narrative:      Alpha Fetoprotein Tumor Marker Reference Range:    0.0-8.3 ng/mL    Note: Normal values apply only to males and nonpregnant females. These results are not interpretable for pregnant females.    Results may be falsely decreased if patient taking Biotin.      Gamma GT [[id number]]  (Normal) Collected: 04/04/23 1019    Specimen: Blood Updated: 04/04/23 1620     GGT 38 U/L     Ferritin  [[id number]]  (Normal) Collected: 04/04/23 1019    Specimen: Blood Updated: 04/04/23 1220     Ferritin 201.30 ng/mL     Narrative:      Results may be falsely decreased if patient taking Biotin.      Iron [[id number]]  (Normal) Collected: 04/04/23 1019    Specimen: Blood Updated: 04/04/23 1204     Iron 78 mcg/dL     Acetaminophen Level [[id number]]  (Normal) Collected: 04/04/23 1019    Specimen: Blood Updated: 04/04/23 1204     Acetaminophen <5.0 mcg/mL     Narrative:      Acetaminophen Therapeutic Range  5-20 ug/mL      Hours after ingestion            Toxic Value    4 Hours                           150 ug/mL    8 Hours                            70 ug/mL   12 Hours                            40 ug/mL   16 Hours                            20 ug/mL    These values apply to a single ingestion only.     TSH [[id number]]  (Abnormal) Collected: 04/04/23 1019    Specimen: Blood Updated: 04/04/23 1200     TSH 0.137 uIU/mL     Basic Metabolic Panel [[id number]]  (Abnormal) Collected: 04/04/23 1019    Specimen: Blood Updated: 04/04/23 1110     Glucose 80 mg/dL      BUN 14 mg/dL      Creatinine 0.67 mg/dL      Sodium 135 mmol/L      Potassium 3.6 mmol/L      Comment: Slight hemolysis detected by analyzer. Results may be affected.        Chloride 97 mmol/L      CO2 23.0 mmol/L      Calcium 8.8 mg/dL      BUN/Creatinine Ratio 20.9     Anion Gap 15.0 mmol/L      eGFR 128.0 mL/min/1.73     Narrative:      GFR Normal >60  Chronic Kidney Disease <60  Kidney Failure <15      CBC Auto Differential [[id number]]  (Abnormal) Collected: 04/04/23 1019    Specimen: Blood Updated: 04/04/23 1047     WBC 13.20 10*3/mm3      RBC 5.26 10*6/mm3      Hemoglobin 14.3 g/dL      Hematocrit 44.6 %      MCV 84.7 fL      MCH 27.2 pg      MCHC 32.1 g/dL      RDW 14.5 %      RDW-SD 42.4 fl      MPV 9.8 fL      Platelets 276 10*3/mm3      Neutrophil % 76.0 %      Lymphocyte % 18.0 %      Monocyte % 5.7 %      Eosinophil % 0.0 %      Basophil % 0.3 %       Neutrophils, Absolute 10.00 10*3/mm3      Lymphocytes, Absolute 2.40 10*3/mm3      Monocytes, Absolute 0.80 10*3/mm3      Eosinophils, Absolute 0.00 10*3/mm3      Basophils, Absolute 0.00 10*3/mm3      nRBC 0.0 /100 WBC           Imaging Results (Last 24 Hours)     Procedure Component Value Units Date/Time    US Liver [[id number]] Collected: 04/04/23 1333     Updated: 04/04/23 1340    Narrative:      US LIVER    Date of Exam: 4/4/2023 12:17 PM EDT    Indication: elevated LFTs.    Comparison: No comparisons available.    Technique: Grayscale and color Doppler ultrasound evaluation of the right upper quadrant was performed.    Findings:  The background liver echogenicity is within normal limits for technique. There is no intrahepatic or ductal dilatation. No perihepatic ascites. There is hepatopedal flow in the portal vein. Visualized hepatic veins are patent. Common bile duct measures 3   mm.      Impression:      Impression:  No acute sonographic abnormality.    Electronically Signed: Venkatesh Ford    4/4/2023 1:37 PM EDT    Workstation ID: [id number]    XR Abdomen KUB [[id number]] Collected: 04/04/23 1333     Updated: 04/04/23 1336    Narrative:      XR ABDOMEN KUB    Date of Exam: 4/4/2023 12:37 PM EDT    Indication: nausea/vomiting.    Comparison: None available.    Findings:  There is a nonobstructive bowel gas pattern. Moderate stool is present throughout the colon. No pathological calcifications are identified. The osseous structures appear intact.      Impression:      Impression:  No acute process identified.    Electronically Signed: Norm Mcneal    4/4/2023 1:34 PM EDT    Workstation ID: [id number]          LAB RESULTS (LAST 7 DAYS)    CBC  Results from last 7 days   Lab Units 04/04/23  1019 04/03/23  1223   WBC 10*3/mm3 13.20* 16.30*   RBC 10*6/mm3 5.26 5.36   HEMOGLOBIN g/dL 14.3 14.7   HEMATOCRIT % 44.6 44.7   MCV fL 84.7 83.4   PLATELETS 10*3/mm3 276 287       BMP  Results from last 7 days   Lab Units  04/04/23  1019 04/03/23  1913 04/03/23  1223   SODIUM mmol/L 135*  --  137   POTASSIUM mmol/L 3.6  --  3.4*   CHLORIDE mmol/L 97*  --  94*   CO2 mmol/L 23.0  --  24.0   BUN mg/dL 14  --  16   CREATININE mg/dL 0.67*  --  0.78   GLUCOSE mg/dL 80  --  122*   MAGNESIUM mg/dL  --  1.9 1.8       CMP   Results from last 7 days   Lab Units 04/04/23  1019 04/03/23  1223   SODIUM mmol/L 135* 137   POTASSIUM mmol/L 3.6 3.4*   CHLORIDE mmol/L 97* 94*   CO2 mmol/L 23.0 24.0   BUN mg/dL 14 16   CREATININE mg/dL 0.67* 0.78   GLUCOSE mg/dL 80 122*   ALBUMIN g/dL  --  4.8   BILIRUBIN mg/dL  --  1.1   ALK PHOS U/L  --  111   AST (SGOT) U/L  --  67*   ALT (SGPT) U/L  --  113*       BNP        TROPONIN  Results from last 7 days   Lab Units 04/03/23  1223   CK TOTAL U/L 104       CoAg        Creatinine Clearance  Estimated Creatinine Clearance: 157.9 mL/min (A) (by C-G formula based on SCr of 0.67 mg/dL (L)).    ABG        Radiology  US Liver    Result Date: 4/4/2023  Impression: No acute sonographic abnormality. Electronically Signed: Venkatesh Ford  4/4/2023 1:37 PM EDT  Workstation ID: [id number]    XR Abdomen KUB    Result Date: 4/4/2023  Impression: No acute process identified. Electronically Signed: Norm Mcneal  4/4/2023 1:34 PM EDT  Workstation ID: [id number]        EKG  I personally viewed and interpreted the patient's EKG/Telemetry data:  ECG 12 Lead Chest Pain   Preliminary Result   HEART RATE= 54  bpm   RR Interval= 1104  ms   IA Interval= 167  ms   P Horizontal Axis= 20  deg   P Front Axis= 31  deg   QRSD Interval= 101  ms   QT Interval= 455  ms   QRS Axis= 85  deg   T Wave Axis= 37  deg   - BORDERLINE ECG -   Slow sinus arrhythmia   Probable lateral infarct, old   When compared with ECG of 03-Apr-2023 12:20:47,   Significant change in rhythm: previously ectopic atrial   Significant repolarization change   Electronically Signed By:    Date and Time of Study: 2023-04-04 09:43:30      ECG 12 Lead Rhythm Change   Final Result    HEART RATE= 59  bpm   RR Interval= 1016  ms   NV Interval= 173  ms   P Horizontal Axis= 50  deg   P Front Axis= -47  deg   QRSD Interval= 105  ms   QT Interval= 492  ms   QRS Axis= 75  deg   T Wave Axis= 25  deg   - BORDERLINE ECG -   Ectopic atrial bradycardia   ST elev, probable normal early repol pattern   When compared with ECG of 16-Nov-2022 15:11:29,   Significant change in rhythm: previously sinus   Electronically Signed By: Yaron Archuleta (Ben) 04-Apr-2023 14:31:58   Date and Time of Study: 2023-04-03 12:20:47      SCANNED - TELEMETRY     Final Result      SCANNED - TELEMETRY     Final Result      SCANNED - TELEMETRY     Final Result      SCANNED - TELEMETRY     Final Result      SCANNED - TELEMETRY     Final Result      SCANNED - TELEMETRY     Final Result            Echocardiogram:          Stress Test:         Cardiac Catheterization:  No results found for this or any previous visit.         Other:         ASSESSMENT & PLAN:    Principal Problem:    Intractable nausea and vomiting  Active Problems:    Acute hepatitis C virus infection without hepatic coma    Abnormal EKG    Heroin withdrawal    Tobacco abuse    Low TSH level    Acute UTI    Abnormal ECG  Review of his ECG suggests sinus bradycardia.  One of the ECG shows ectopic atrial rhythm.  ST elevation is likely secondary to early repolarization changes in this young man  Echocardiogram shows preserved LV function with mild pulmonary hypertension  We will provide MCOT now that he is ready for discharge     Intractable nausea and vomiting  Likely to be secondary to heroin withdrawal  We will treat with antiemetics.  Encourage oral hydration  PPI and Reglan as needed     Heroin withdrawal  Management as per primary team     New onset hepatitis C  Newly diagnosed.  Consult GI for possible treatment     Low TSH level  TSH is 0.137.  Check T3 and T4     UTI  He has been started on ceftriaxone  Pending urine cultures     Tobacco abuse/substance  abuse  Counseling provided regarding abstinence from drug use.  Smoking cessation counseling also provided to the patient      Lalo Whittaker MD  04/05/23  08:57 EDT

## 2023-04-05 NOTE — PLAN OF CARE
Goal Outcome Evaluation:         Patient reporting resolved nausea and feeling much better this shift.  Tolerating PO intake; discharging home with family this shift.

## 2023-04-06 LAB
LKM-1 AB SER-ACNC: 1.1 UNITS (ref 0–20)
MITOCHONDRIA M2 IGG SER-ACNC: <20 UNITS (ref 0–20)
SMA IGG SER-ACNC: 15 UNITS (ref 0–19)

## 2023-04-07 LAB
ANA SER QL: NEGATIVE
HCV GENTYP SERPL NAA+PROBE: NORMAL
HCV RNA SERPL NAA+PROBE-ACNC: NORMAL IU/ML
HCV RNA SERPL NAA+PROBE-LOG IU: 6.39 LOG10 IU/ML
LABORATORY COMMENT REPORT: NORMAL
QT INTERVAL: 455 MS
TEST INFORMATION: NORMAL

## 2023-04-08 LAB
BACTERIA SPEC AEROBE CULT: NORMAL
BACTERIA SPEC AEROBE CULT: NORMAL

## (undated) DEVICE — KT SURG TURNOVER 050

## (undated) DEVICE — PAD,ABDOMINAL,5"X9",STERILE,LF,1/PK: Brand: MEDLINE INDUSTRIES, INC.

## (undated) DEVICE — PK EXTREM 50

## (undated) DEVICE — PAD UNDERCAST WYTEX 4IN 4YD LF STRL

## (undated) DEVICE — SOLUTION,WATER,IRRIGATION,1000ML,STERILE: Brand: MEDLINE

## (undated) DEVICE — TBG PENCL TELESCP MEGADYNE SMOKE EVAC 15FT

## (undated) DEVICE — 3M™ STERI-DRAPE™ U-DRAPE 1015: Brand: STERI-DRAPE™

## (undated) DEVICE — CUFF TOURNI 1BLADDER 1PRT 18IN STRL

## (undated) DEVICE — GLV SURG SENSICARE PI ORTHO SZ8 LF STRL

## (undated) DEVICE — UNDRGLV SURG BIOGEL PIMICROINDICATOR SYNTH SZ8 LF STRL

## (undated) DEVICE — GAUZE,PACKING STRIP,IODOFORM,1"X5YD,STRL: Brand: CURAD

## (undated) DEVICE — UNDERGLV SURG BIOGEL INDICAT PI SZ8 BLU